# Patient Record
Sex: MALE | Race: WHITE | NOT HISPANIC OR LATINO | Employment: FULL TIME | URBAN - NONMETROPOLITAN AREA
[De-identification: names, ages, dates, MRNs, and addresses within clinical notes are randomized per-mention and may not be internally consistent; named-entity substitution may affect disease eponyms.]

---

## 2023-01-01 ENCOUNTER — DOCUMENTATION ONLY (OUTPATIENT)
Dept: RADIATION ONCOLOGY | Facility: HOSPITAL | Age: 58
End: 2023-01-01

## 2023-01-01 ENCOUNTER — RESULTS ONLY (OUTPATIENT)
Dept: RADIATION ONCOLOGY | Facility: HOSPITAL | Age: 58
End: 2023-01-01

## 2023-01-01 ENCOUNTER — MEDICAL CORRESPONDENCE (OUTPATIENT)
Dept: HEALTH INFORMATION MANAGEMENT | Facility: OTHER | Age: 58
End: 2023-01-01

## 2023-01-01 ENCOUNTER — APPOINTMENT (OUTPATIENT)
Dept: RADIATION ONCOLOGY | Facility: HOSPITAL | Age: 58
End: 2023-01-01
Payer: COMMERCIAL

## 2023-01-01 ENCOUNTER — DOCUMENTATION ONLY (OUTPATIENT)
Dept: CARE COORDINATION | Facility: CLINIC | Age: 58
End: 2023-01-01

## 2023-01-01 ENCOUNTER — ALLIED HEALTH/NURSE VISIT (OUTPATIENT)
Dept: RADIATION ONCOLOGY | Facility: HOSPITAL | Age: 58
End: 2023-01-01
Payer: COMMERCIAL

## 2023-01-01 ENCOUNTER — HOSPITAL ENCOUNTER (EMERGENCY)
Facility: HOSPITAL | Age: 58
Discharge: HOME | End: 2023-10-11
Attending: NURSE PRACTITIONER | Admitting: NURSE PRACTITIONER

## 2023-01-01 ENCOUNTER — OFFICE VISIT (OUTPATIENT)
Dept: RADIATION ONCOLOGY | Facility: HOSPITAL | Age: 58
End: 2023-01-01

## 2023-01-01 ENCOUNTER — CARE COORDINATION (OUTPATIENT)
Dept: ONCOLOGY | Facility: OTHER | Age: 58
End: 2023-01-01

## 2023-01-01 ENCOUNTER — PATIENT OUTREACH (OUTPATIENT)
Dept: CARE COORDINATION | Facility: CLINIC | Age: 58
End: 2023-01-01

## 2023-01-01 ENCOUNTER — ALLIED HEALTH/NURSE VISIT (OUTPATIENT)
Dept: RADIATION ONCOLOGY | Facility: HOSPITAL | Age: 58
End: 2023-01-01

## 2023-01-01 ENCOUNTER — ONCOLOGY VISIT (OUTPATIENT)
Dept: RADIATION ONCOLOGY | Facility: HOSPITAL | Age: 58
End: 2023-01-01
Attending: STUDENT IN AN ORGANIZED HEALTH CARE EDUCATION/TRAINING PROGRAM
Payer: COMMERCIAL

## 2023-01-01 ENCOUNTER — OFFICE VISIT (OUTPATIENT)
Dept: RADIATION ONCOLOGY | Facility: HOSPITAL | Age: 58
End: 2023-01-01
Payer: COMMERCIAL

## 2023-01-01 ENCOUNTER — TELEPHONE (OUTPATIENT)
Dept: RADIATION ONCOLOGY | Facility: HOSPITAL | Age: 58
End: 2023-01-01

## 2023-01-01 ENCOUNTER — HEALTH MAINTENANCE LETTER (OUTPATIENT)
Age: 58
End: 2023-01-01

## 2023-01-01 ENCOUNTER — OFFICE VISIT (OUTPATIENT)
Dept: OTOLARYNGOLOGY | Facility: OTHER | Age: 58
End: 2023-01-01
Attending: OTOLARYNGOLOGY

## 2023-01-01 ENCOUNTER — HOSPITAL ENCOUNTER (OUTPATIENT)
Dept: EDUCATION SERVICES | Facility: HOSPITAL | Age: 58
Discharge: HOME | End: 2023-11-02

## 2023-01-01 ENCOUNTER — OFFICE VISIT (OUTPATIENT)
Dept: OTOLARYNGOLOGY | Facility: OTHER | Age: 58
End: 2023-01-01
Attending: OTOLARYNGOLOGY
Payer: COMMERCIAL

## 2023-01-01 ENCOUNTER — APPOINTMENT (OUTPATIENT)
Dept: GENERAL RADIOLOGY | Facility: HOSPITAL | Age: 58
End: 2023-01-01
Attending: NURSE PRACTITIONER

## 2023-01-01 VITALS
DIASTOLIC BLOOD PRESSURE: 68 MMHG | OXYGEN SATURATION: 93 % | HEART RATE: 80 BPM | BODY MASS INDEX: 17.31 KG/M2 | WEIGHT: 104 LBS | SYSTOLIC BLOOD PRESSURE: 112 MMHG | RESPIRATION RATE: 24 BRPM | TEMPERATURE: 97.1 F

## 2023-01-01 VITALS
DIASTOLIC BLOOD PRESSURE: 52 MMHG | WEIGHT: 109 LBS | OXYGEN SATURATION: 98 % | HEART RATE: 73 BPM | SYSTOLIC BLOOD PRESSURE: 106 MMHG | HEIGHT: 65 IN | BODY MASS INDEX: 18.16 KG/M2 | TEMPERATURE: 96.5 F

## 2023-01-01 VITALS
HEART RATE: 65 BPM | RESPIRATION RATE: 23 BRPM | WEIGHT: 106.3 LBS | DIASTOLIC BLOOD PRESSURE: 62 MMHG | BODY MASS INDEX: 17.71 KG/M2 | OXYGEN SATURATION: 97 % | TEMPERATURE: 97.5 F | HEIGHT: 65 IN | SYSTOLIC BLOOD PRESSURE: 108 MMHG

## 2023-01-01 VITALS
WEIGHT: 108 LBS | HEART RATE: 78 BPM | BODY MASS INDEX: 17.97 KG/M2 | DIASTOLIC BLOOD PRESSURE: 62 MMHG | SYSTOLIC BLOOD PRESSURE: 108 MMHG | RESPIRATION RATE: 16 BRPM | OXYGEN SATURATION: 96 % | TEMPERATURE: 97.1 F

## 2023-01-01 VITALS
OXYGEN SATURATION: 91 % | WEIGHT: 105 LBS | SYSTOLIC BLOOD PRESSURE: 102 MMHG | RESPIRATION RATE: 16 BRPM | BODY MASS INDEX: 17.47 KG/M2 | DIASTOLIC BLOOD PRESSURE: 64 MMHG | TEMPERATURE: 97.3 F | HEART RATE: 68 BPM

## 2023-01-01 VITALS
DIASTOLIC BLOOD PRESSURE: 44 MMHG | HEIGHT: 65 IN | BODY MASS INDEX: 18.14 KG/M2 | RESPIRATION RATE: 16 BRPM | SYSTOLIC BLOOD PRESSURE: 119 MMHG | TEMPERATURE: 98.6 F | OXYGEN SATURATION: 96 % | HEART RATE: 68 BPM

## 2023-01-01 VITALS
HEART RATE: 76 BPM | SYSTOLIC BLOOD PRESSURE: 104 MMHG | BODY MASS INDEX: 18.14 KG/M2 | TEMPERATURE: 97.1 F | WEIGHT: 109 LBS | OXYGEN SATURATION: 98 % | DIASTOLIC BLOOD PRESSURE: 54 MMHG

## 2023-01-01 VITALS
HEART RATE: 82 BPM | RESPIRATION RATE: 16 BRPM | SYSTOLIC BLOOD PRESSURE: 100 MMHG | WEIGHT: 106.2 LBS | TEMPERATURE: 97.4 F | OXYGEN SATURATION: 95 % | DIASTOLIC BLOOD PRESSURE: 64 MMHG | BODY MASS INDEX: 17.67 KG/M2

## 2023-01-01 VITALS
RESPIRATION RATE: 17 BRPM | WEIGHT: 107.2 LBS | HEART RATE: 70 BPM | OXYGEN SATURATION: 97 % | TEMPERATURE: 97.6 F | BODY MASS INDEX: 17.84 KG/M2 | DIASTOLIC BLOOD PRESSURE: 62 MMHG | SYSTOLIC BLOOD PRESSURE: 110 MMHG

## 2023-01-01 VITALS
SYSTOLIC BLOOD PRESSURE: 88 MMHG | OXYGEN SATURATION: 96 % | BODY MASS INDEX: 18.09 KG/M2 | RESPIRATION RATE: 16 BRPM | DIASTOLIC BLOOD PRESSURE: 48 MMHG | HEART RATE: 72 BPM | WEIGHT: 108.7 LBS | TEMPERATURE: 97.1 F

## 2023-01-01 VITALS — OXYGEN SATURATION: 95 % | HEART RATE: 95 BPM

## 2023-01-01 DIAGNOSIS — C76.0 HEAD AND NECK CANCER (H): Primary | ICD-10-CM

## 2023-01-01 DIAGNOSIS — R22.1 LOCALIZED SWELLING, MASS AND LUMP, NECK: ICD-10-CM

## 2023-01-01 DIAGNOSIS — T66.XXXA RADIATION ESOPHAGITIS: Primary | ICD-10-CM

## 2023-01-01 DIAGNOSIS — C32.1 MALIGNANT NEOPLASM OF SUPRAGLOTTIS (H): Primary | ICD-10-CM

## 2023-01-01 DIAGNOSIS — K20.80 RADIATION ESOPHAGITIS: ICD-10-CM

## 2023-01-01 DIAGNOSIS — C32.1 PRIMARY SQUAMOUS CELL CARCINOMA OF SUPRAGLOTTIS (H): Primary | ICD-10-CM

## 2023-01-01 DIAGNOSIS — R11.0 NAUSEA: ICD-10-CM

## 2023-01-01 DIAGNOSIS — R63.4 WEIGHT LOSS: ICD-10-CM

## 2023-01-01 DIAGNOSIS — Z87.891 HISTORY OF TOBACCO ABUSE: ICD-10-CM

## 2023-01-01 DIAGNOSIS — T85.598A FEEDING TUBE DYSFUNCTION, INITIAL ENCOUNTER: ICD-10-CM

## 2023-01-01 DIAGNOSIS — J98.8 CONGESTION OF UPPER AIRWAY: Primary | ICD-10-CM

## 2023-01-01 DIAGNOSIS — R68.2 DRY MOUTH: ICD-10-CM

## 2023-01-01 DIAGNOSIS — F10.11 HISTORY OF ALCOHOL ABUSE: ICD-10-CM

## 2023-01-01 DIAGNOSIS — C76.0 HEAD AND NECK CANCER (H): ICD-10-CM

## 2023-01-01 DIAGNOSIS — K20.80 RADIATION ESOPHAGITIS: Primary | ICD-10-CM

## 2023-01-01 DIAGNOSIS — Z43.0 TRACHEOSTOMY CARE (H): ICD-10-CM

## 2023-01-01 DIAGNOSIS — J39.2 OROPHARYNGEAL BLEEDING: ICD-10-CM

## 2023-01-01 DIAGNOSIS — T66.XXXA RADIATION ESOPHAGITIS: ICD-10-CM

## 2023-01-01 LAB
RAD ONC ARIA COURSE ID: NORMAL
RAD ONC ARIA COURSE LAST TREATMENT DATE: NORMAL
RAD ONC ARIA COURSE START DATE: NORMAL
RAD ONC ARIA COURSE TREATMENT ELAPSED DAYS: 0
RAD ONC ARIA COURSE TREATMENT ELAPSED DAYS: 1
RAD ONC ARIA COURSE TREATMENT ELAPSED DAYS: 12
RAD ONC ARIA COURSE TREATMENT ELAPSED DAYS: 13
RAD ONC ARIA COURSE TREATMENT ELAPSED DAYS: 14
RAD ONC ARIA COURSE TREATMENT ELAPSED DAYS: 15
RAD ONC ARIA COURSE TREATMENT ELAPSED DAYS: 16
RAD ONC ARIA COURSE TREATMENT ELAPSED DAYS: 19
RAD ONC ARIA COURSE TREATMENT ELAPSED DAYS: 2
RAD ONC ARIA COURSE TREATMENT ELAPSED DAYS: 20
RAD ONC ARIA COURSE TREATMENT ELAPSED DAYS: 21
RAD ONC ARIA COURSE TREATMENT ELAPSED DAYS: 22
RAD ONC ARIA COURSE TREATMENT ELAPSED DAYS: 26
RAD ONC ARIA COURSE TREATMENT ELAPSED DAYS: 28
RAD ONC ARIA COURSE TREATMENT ELAPSED DAYS: 29
RAD ONC ARIA COURSE TREATMENT ELAPSED DAYS: 30
RAD ONC ARIA COURSE TREATMENT ELAPSED DAYS: 33
RAD ONC ARIA COURSE TREATMENT ELAPSED DAYS: 34
RAD ONC ARIA COURSE TREATMENT ELAPSED DAYS: 35
RAD ONC ARIA COURSE TREATMENT ELAPSED DAYS: 36
RAD ONC ARIA COURSE TREATMENT ELAPSED DAYS: 40
RAD ONC ARIA COURSE TREATMENT ELAPSED DAYS: 42
RAD ONC ARIA COURSE TREATMENT ELAPSED DAYS: 43
RAD ONC ARIA COURSE TREATMENT ELAPSED DAYS: 44
RAD ONC ARIA COURSE TREATMENT ELAPSED DAYS: 47
RAD ONC ARIA COURSE TREATMENT ELAPSED DAYS: 48
RAD ONC ARIA COURSE TREATMENT ELAPSED DAYS: 5
RAD ONC ARIA COURSE TREATMENT ELAPSED DAYS: 7
RAD ONC ARIA COURSE TREATMENT ELAPSED DAYS: 8
RAD ONC ARIA COURSE TREATMENT ELAPSED DAYS: 9
RAD ONC ARIA FIRST TREATMENT DATE: NORMAL
RAD ONC ARIA PLAN FRACTIONS TREATED TO DATE: 1
RAD ONC ARIA PLAN FRACTIONS TREATED TO DATE: 10
RAD ONC ARIA PLAN FRACTIONS TREATED TO DATE: 11
RAD ONC ARIA PLAN FRACTIONS TREATED TO DATE: 12
RAD ONC ARIA PLAN FRACTIONS TREATED TO DATE: 13
RAD ONC ARIA PLAN FRACTIONS TREATED TO DATE: 14
RAD ONC ARIA PLAN FRACTIONS TREATED TO DATE: 15
RAD ONC ARIA PLAN FRACTIONS TREATED TO DATE: 16
RAD ONC ARIA PLAN FRACTIONS TREATED TO DATE: 17
RAD ONC ARIA PLAN FRACTIONS TREATED TO DATE: 18
RAD ONC ARIA PLAN FRACTIONS TREATED TO DATE: 19
RAD ONC ARIA PLAN FRACTIONS TREATED TO DATE: 2
RAD ONC ARIA PLAN FRACTIONS TREATED TO DATE: 20
RAD ONC ARIA PLAN FRACTIONS TREATED TO DATE: 21
RAD ONC ARIA PLAN FRACTIONS TREATED TO DATE: 22
RAD ONC ARIA PLAN FRACTIONS TREATED TO DATE: 23
RAD ONC ARIA PLAN FRACTIONS TREATED TO DATE: 24
RAD ONC ARIA PLAN FRACTIONS TREATED TO DATE: 25
RAD ONC ARIA PLAN FRACTIONS TREATED TO DATE: 26
RAD ONC ARIA PLAN FRACTIONS TREATED TO DATE: 27
RAD ONC ARIA PLAN FRACTIONS TREATED TO DATE: 28
RAD ONC ARIA PLAN FRACTIONS TREATED TO DATE: 29
RAD ONC ARIA PLAN FRACTIONS TREATED TO DATE: 3
RAD ONC ARIA PLAN FRACTIONS TREATED TO DATE: 30
RAD ONC ARIA PLAN FRACTIONS TREATED TO DATE: 4
RAD ONC ARIA PLAN FRACTIONS TREATED TO DATE: 5
RAD ONC ARIA PLAN FRACTIONS TREATED TO DATE: 6
RAD ONC ARIA PLAN FRACTIONS TREATED TO DATE: 7
RAD ONC ARIA PLAN FRACTIONS TREATED TO DATE: 8
RAD ONC ARIA PLAN FRACTIONS TREATED TO DATE: 9
RAD ONC ARIA PLAN ID: NORMAL
RAD ONC ARIA PLAN PRESCRIBED DOSE PER FRACTION: 2.2 GY
RAD ONC ARIA PLAN TOTAL FRACTIONS PRESCRIBED: 30
RAD ONC ARIA PLAN TOTAL PRESCRIBED DOSE: 6600 CGY
RAD ONC ARIA REFERENCE POINT DOSAGE GIVEN TO DATE: NORMAL GY
RAD ONC ARIA REFERENCE POINT ID: NORMAL

## 2023-01-01 PROCEDURE — 77386 HC IMRT TREATMENT DELIVERY, COMPLEX: CPT | Performed by: STUDENT IN AN ORGANIZED HEALTH CARE EDUCATION/TRAINING PROGRAM

## 2023-01-01 PROCEDURE — G0463 HOSPITAL OUTPT CLINIC VISIT: HCPCS

## 2023-01-01 PROCEDURE — 99212 OFFICE O/P EST SF 10 MIN: CPT | Performed by: NURSE PRACTITIONER

## 2023-01-01 PROCEDURE — 77014 PR CT GUIDE FOR PLACEMENT RADIATION THERAPY FIELDS: CPT | Mod: 26 | Performed by: STUDENT IN AN ORGANIZED HEALTH CARE EDUCATION/TRAINING PROGRAM

## 2023-01-01 PROCEDURE — 77338 DESIGN MLC DEVICE FOR IMRT: CPT | Mod: 26 | Performed by: RADIOLOGY

## 2023-01-01 PROCEDURE — 77336 RADIATION PHYSICS CONSULT: CPT | Performed by: STUDENT IN AN ORGANIZED HEALTH CARE EDUCATION/TRAINING PROGRAM

## 2023-01-01 PROCEDURE — 77014 HC CT GUIDE FOR PLACEMENT RADIATION THERAPY FIELDS: CPT | Performed by: STUDENT IN AN ORGANIZED HEALTH CARE EDUCATION/TRAINING PROGRAM

## 2023-01-01 PROCEDURE — 77301 RADIOTHERAPY DOSE PLAN IMRT: CPT | Mod: 26 | Performed by: RADIOLOGY

## 2023-01-01 PROCEDURE — 99205 OFFICE O/P NEW HI 60 MIN: CPT | Performed by: STUDENT IN AN ORGANIZED HEALTH CARE EDUCATION/TRAINING PROGRAM

## 2023-01-01 PROCEDURE — 77338 DESIGN MLC DEVICE FOR IMRT: CPT | Performed by: STUDENT IN AN ORGANIZED HEALTH CARE EDUCATION/TRAINING PROGRAM

## 2023-01-01 PROCEDURE — 77300 RADIATION THERAPY DOSE PLAN: CPT | Mod: 26 | Performed by: RADIOLOGY

## 2023-01-01 PROCEDURE — 999N000107 HC STATISTIC NUTRITIONAL THERAPY NO CHARGE: Performed by: DIETITIAN, REGISTERED

## 2023-01-01 PROCEDURE — 77427 RADIATION TX MANAGEMENT X5: CPT | Performed by: STUDENT IN AN ORGANIZED HEALTH CARE EDUCATION/TRAINING PROGRAM

## 2023-01-01 PROCEDURE — 77300 RADIATION THERAPY DOSE PLAN: CPT | Performed by: STUDENT IN AN ORGANIZED HEALTH CARE EDUCATION/TRAINING PROGRAM

## 2023-01-01 PROCEDURE — 31575 DIAGNOSTIC LARYNGOSCOPY: CPT | Performed by: OTOLARYNGOLOGY

## 2023-01-01 PROCEDURE — 99204 OFFICE O/P NEW MOD 45 MIN: CPT | Mod: 25 | Performed by: OTOLARYNGOLOGY

## 2023-01-01 PROCEDURE — 74018 RADEX ABDOMEN 1 VIEW: CPT

## 2023-01-01 PROCEDURE — 77301 RADIOTHERAPY DOSE PLAN IMRT: CPT | Performed by: STUDENT IN AN ORGANIZED HEALTH CARE EDUCATION/TRAINING PROGRAM

## 2023-01-01 RX ORDER — LORAZEPAM 0.5 MG/1
0.5 TABLET ORAL EVERY 4 HOURS
COMMUNITY
Start: 2023-01-01

## 2023-01-01 RX ORDER — ALBUTEROL SULFATE 0.63 MG/3ML
0.63 SOLUTION RESPIRATORY (INHALATION) EVERY 4 HOURS PRN
COMMUNITY
Start: 2023-01-01

## 2023-01-01 RX ORDER — LACTOSE-REDUCED FOOD
10 LIQUID (ML) ORAL 4 TIMES DAILY
Qty: 8280.59 ML | Refills: 3 | Status: SHIPPED | OUTPATIENT
Start: 2023-01-01 | End: 2023-01-01

## 2023-01-01 RX ORDER — HYDROXYZINE HYDROCHLORIDE 10 MG/1
10 TABLET, FILM COATED ORAL EVERY 8 HOURS PRN
COMMUNITY
Start: 2023-01-01

## 2023-01-01 RX ORDER — POT SOR/HE-CELLULOS/POV/HYALUR
1 GEL IN PACKET (ML) MUCOUS MEMBRANE 4 TIMES DAILY
Qty: 445 ML | Refills: 1 | Status: SHIPPED | OUTPATIENT
Start: 2023-01-01 | End: 2023-01-01

## 2023-01-01 RX ORDER — GUAIFENESIN 200 MG/10ML
300 LIQUID ORAL EVERY 6 HOURS PRN
Qty: 473 ML | Refills: 1 | Status: SHIPPED | OUTPATIENT
Start: 2023-01-01

## 2023-01-01 RX ORDER — NICOTINE 21 MG/24HR
1 PATCH, TRANSDERMAL 24 HOURS TRANSDERMAL EVERY 24 HOURS
COMMUNITY

## 2023-01-01 RX ORDER — PROCHLORPERAZINE MALEATE 10 MG
10 TABLET ORAL EVERY 8 HOURS PRN
Qty: 90 TABLET | Refills: 0 | Status: SHIPPED | OUTPATIENT
Start: 2023-01-01 | End: 2023-01-01

## 2023-01-01 RX ORDER — HYDROCODONE BITARTRATE AND ACETAMINOPHEN 7.5; 325 MG/15ML; MG/15ML
15 SOLUTION ORAL EVERY 6 HOURS
COMMUNITY

## 2023-01-01 RX ORDER — ACETAMINOPHEN 500 MG
500-1000 TABLET ORAL EVERY 6 HOURS PRN
COMMUNITY

## 2023-01-01 RX ORDER — LACTOSE-REDUCED FOOD
10 LIQUID (ML) ORAL 4 TIMES DAILY
Qty: 8280.59 ML | Refills: 3 | Status: SHIPPED | OUTPATIENT
Start: 2023-01-01

## 2023-01-01 RX ORDER — DIPHENHYDRAMINE HCL 50 MG
50 CAPSULE ORAL
COMMUNITY

## 2023-01-01 RX ORDER — CIPROFLOXACIN 500 MG/1
500 TABLET, FILM COATED ORAL 2 TIMES DAILY
COMMUNITY
Start: 2023-01-01 | End: 2023-01-01

## 2023-01-01 ASSESSMENT — ENCOUNTER SYMPTOMS
INSOMNIA: 0
DIARRHEA: 0
COUGH: 0
FEVER: 0
CONSTIPATION: 0
VOMITING: 0
SHORTNESS OF BREATH: 0
WEIGHT LOSS: 1
VOMITING: 0
CHILLS: 0
ACTIVITY CHANGE: 1
HEADACHES: 0
DIARRHEA: 0
NAUSEA: 0
FEVER: 0
NAUSEA: 0
ABDOMINAL PAIN: 0
SHORTNESS OF BREATH: 0
CHILLS: 0

## 2023-01-01 ASSESSMENT — PAIN SCALES - GENERAL
PAINLEVEL: SEVERE PAIN (6)
PAINLEVEL: MODERATE PAIN (4)
PAINLEVEL: MODERATE PAIN (4)
PAINLEVEL: SEVERE PAIN (6)
PAINLEVEL: MILD PAIN (2)
PAINLEVEL: NO PAIN (0)
PAINLEVEL: WORST PAIN (10)
PAINLEVEL: MODERATE PAIN (4)
PAINLEVEL: SEVERE PAIN (7)

## 2023-01-01 ASSESSMENT — ACTIVITIES OF DAILY LIVING (ADL): ADLS_ACUITY_SCORE: 35

## 2023-01-01 ASSESSMENT — PATIENT HEALTH QUESTIONNAIRE - PHQ9: SUM OF ALL RESPONSES TO PHQ QUESTIONS 1-9: 3

## 2023-08-08 NOTE — TELEPHONE ENCOUNTER
Clinic Care Coordination Contact  Care Team Conversations    Referral due to needing financial support due to ongoing complex medical conditions. Provided him number for [facility] and local VW officer in [place].    PRITI Farah on 8/8/2023 at 11:55 AM

## 2023-08-11 NOTE — PROGRESS NOTES
document embedded image  Patient Name: Gómez Keenan    Address: [address] [facility] [address]    YOB: 1965    [place] [address]    MR Number: [id number]    Phone: [phone]  PCP: UNKNOWN            Appointment Date: 08/08/23   Visit Provider: Manish Mitchell MD    cc: UNKNOWN; ~    ENT Progress Note  Intake  Visit Reasons: Mass on neck    HPI  History of Present Illness  Chief complaint:  Mass on neck    History  The patient is a 58-year-old man with a 60 pack year smoking history who was [facility] us [facility] the [facility] for [facility] with a neck mass.  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  His chest CT revealed significant emphysema with multiple pulmonary nodules.  He is had significant swallowing difficulties in his lost a great deal of weight.  He admits he is having some breathing issues in his noted substantial change in his voice.  I have access to his reports on his CTs but unfortunately has films were not forwarded for my review today.     Exam  Neck-the patient is very thin and he has multiple palpable hard cervical lymph nodes bilaterally  Nasal-no obstruction or purulence noted  Oral cavity oropharynx-free of lesion  Indirect laryngoscopy difficult secondary to gag  Flexible fiberoptic laryngoscopy-a flexible scope was passed through his left nostril.  His nasopharynx is free of disease  Inspection of his hypopharynx and larynx reveals necrotic tumor that appears to have destroyed the majority of his epiglottis in his invading into his tongue base and supraglottic larynx.  The left side of his larynx is fixed.  I can only visualize the right vocal cord which moves.  His airway is narrowed but safe for the moment.  General-the patient appears weak and cachectic but in no distress  Neuro-there are no cranial nerve deficits aside from his disturbed larynx.    A&P  Assessment  & Plan  (1) Cancer of supraglottis:        Status: Acute        Code(s):  C32.1 - Malignant neoplasm of supraglottis           (2) Neck mass:        Status: Acute        Code(s):  R22.1 - Localized swelling, mass and lump, neck             Plan  We need a PET-CT to assess his multiple pulmonary nodules in the face of possible metastatic disease.  I need to carefully review his laryngeal CT to assess candidacy for total laryngectomy.  He will likely need a tracheotomy to get him scoped and biopsied.  I will contact him later in the week after I have had a chance to review his films.        Orders:  Orders  PET CT skull to thigh Today C32.1 - Malignant neoplasm of supraglottis, R22.1 - Localized swelling, mass and lump, neck                   Manish Mitchell MD    Filed: 08/08/23 1146    <Electronically signed by Manish Mitchell MD> 08/08/23 1256

## 2023-08-29 NOTE — NURSING NOTE
Phone call to [facility] [phone].  They do not have a prior authorization on file.  Call to Dr. Danielle's office and spoke to Geovanna.  Dr. Danielle's office will submit a Request For Service to [facility].  Once prior authorization is received, patient will be scheduled for consultation.      Chelsea Trejo RN

## 2023-09-13 PROBLEM — C76.0 HEAD AND NECK CANCER (H): Status: ACTIVE | Noted: 2023-01-01

## 2023-09-13 PROBLEM — M54.50 CHRONIC LOW BACK PAIN: Status: ACTIVE | Noted: 2023-01-01

## 2023-09-13 PROBLEM — I10 HYPERTENSION: Status: ACTIVE | Noted: 2023-01-01

## 2023-09-13 PROBLEM — J44.1 CHRONIC OBSTRUCTIVE PULMONARY DISEASE WITH (ACUTE) EXACERBATION (H): Chronic | Status: ACTIVE | Noted: 2023-01-01

## 2023-09-13 PROBLEM — J38.6 SUPRAGLOTTIC AIRWAY OBSTRUCTION: Status: ACTIVE | Noted: 2023-01-01

## 2023-09-13 PROBLEM — R22.1 MASS OF NECK: Status: ACTIVE | Noted: 2023-01-01

## 2023-09-13 PROBLEM — F43.22 ADJUSTMENT DISORDER WITH ANXIETY: Status: ACTIVE | Noted: 2023-01-01

## 2023-09-13 PROBLEM — H93.19 TINNITUS: Status: ACTIVE | Noted: 2023-01-01

## 2023-09-13 PROBLEM — G89.29 CHRONIC LOW BACK PAIN: Status: ACTIVE | Noted: 2023-01-01

## 2023-09-13 PROBLEM — E43 SEVERE PROTEIN-CALORIE MALNUTRITION (H): Status: ACTIVE | Noted: 2023-01-01

## 2023-09-13 PROBLEM — R91.1 LUNG NODULE: Status: ACTIVE | Noted: 2023-01-01

## 2023-09-13 PROBLEM — M19.90 OSTEOARTHROSIS: Status: ACTIVE | Noted: 2023-01-01

## 2023-09-13 NOTE — NURSING NOTE
VA prior authorization received for radiation therapy 9/13/23.  [facility] authorization  [id number]  Expiration date: 3/5/2024.     Chelsea Trejo RN

## 2023-09-13 NOTE — PROGRESS NOTES
Name: Gómez Keeann MRN: [id number]   : 1965 (58 year old)  Date of Service: 2023   Referring: Liborio Danielle       Diagnosis: head and neck    Prior radiation therapy: None    Prior chemotherapy: None    T/c to obtain health history prior to radiation therapy consult. Spoke with patient.  He feels he is improving everyday since discharge from hospital last week, but it has been emotionally hard because his stepdaughter and granddaughter are currently hospitalized following a car accident.  He is independent with her trach cares.  He has a NG tube and is independent with his tube feedings.  He can drink small amounts of liquids and was okayed to start eating soft foods.  He has met with speech therapy and has been instructed on swallowing exercises.  Discussed cancer rehab and he declined.  He does not have any transportation issues and plans to drive himself because his wife works.  He does have financial concerns and will be given financial resources tomorrow.  He will also meet with our SW if needed.  Given general overview of radiation  therapy and side effects to the neck area.  He has no further questions or concerns.  Appointment confirmed for tomorrow.       Chelsea Trejo RN

## 2023-09-13 NOTE — PROGRESS NOTES
"[facility]  Nutrition Assessment    Gómez Keenan    1965   Sep 14, 2023    Diagnosis: head and neck cancer    Height: 5'5\" Weight: 109# (stated wt)    Usual Weight: 136# Weight Change: -30# over the past year      Past Medical History:   Diagnosis Date    Adjustment disorder with anxiety 09/07/2023    Alcoholic hepatitis 2015    Anemia     CHF (congestive heart failure) (H) 11/23/2012    Chronic low back pain 09/07/2023    Chronic obstructive pulmonary disease with (acute) exacerbation (H) 08/22/2023    Esophageal varices (H)     Per 4/10/19, hx of, repaired w/laser endoscopy in 2004, related to alcohol use    GI bleed 2004    GI bleed, in remission, he was admitted to [facility] [place], [place], from 1/22/2004 to 1/24/2004    Head and neck cancer (H) 08/25/2023    Hypertension 08/22/2023    Hyponatremia 01/08/2015    Insomnia, unspecified 01/30/2004    Formatting of this note might be different from the original. He will go to sleep for an hour and then wakes up, he can use Lorazepam at bedtime. Updated per 10/1/17 IMO import    Lung nodule 08/29/2023    Open fracture of finger, distal phalanx 2013    Osteoarthritis 2019    Osteoarthrosis 09/07/2023    Peptic ulcer disease 2012    Pericardial effusion 2012    Pericarditis 11/30/2012    Serrated adenoma of colon 09/11/2014    Severe protein-calorie malnutrition (H) 08/23/2023    Formatting of this note might be different from the original. Pt meets ASPEN criteria for severe malnutrition in the context of acute / likely chronic illness. See Dietitian entry 8/23 for supporting information and plan of care.    Supraglottic airway obstruction 08/22/2023    Tinnitus 09/07/2023    Tobacco use disorder 01/08/2007       1. Receiving Chemotherapy? Yes  - 1 point  2. Weight Loss per Month (in pounds) Based on Usual Weight: 0    100# 100-120# 120-150# 150-200# greater than 200# Pt. System   greater than 5 5 - 6 6 - 8 7 - 10 greater than 10 1 Point   greater " than 7 7- 9 9 - 11 11 - 14 greater than 15 2 Points   greater than 10 10 - 12 12 - 15 15 - 20 greater than 20 3 Points       3. Eating Problems: ( 1 Point for Each Problem)       Loss of Appetite     No - 0 points    Difficulty Swallowing    Yes  - 1 point   Nausea    No - 0 points    Early Satiety    No - 0 points   Vomiting    No - 0 points    Taste/Smell Aversions  No - 0 points    Diarrhea    No - 0 points    Esophageal Reflux   No - 0 points   Mouth Soreness/Difficulty Chewing  No- 0 points          Total: 1    4.  Automatic Referral for the Following Diagnosis or Situations: Tube Feedings - Jevity 1200 mL/day via NG. Patient declined another dietary referral.      Comments: He has already met with a dietician and is tolerating his tube feedings and has no concerns.      Total Score: 2 (Scores greater than or equal to 4 will receive a Dietician Consult)        Chelsea Trejo RN

## 2023-09-13 NOTE — PROGRESS NOTES
[facility]    RADIATION ONCOLOGY CONSULTATION      Gómez Keenan  is referred by Dr. Danielle an oncology consultation.    PRIMARY PHYSICIAN: No Ref-Primary, Physician     Cancer Staging   No matching staging information was found for the patient.    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 His staging appears to be [id number]. He has an NG tube in place and unfortunately could not get a PEG placed due to the location of his stomach being directly posterior to the colon. We extensively discussed with the patient and his wife today the rationale for radiotherapy site of primary disease and bilateral neck nodes.  The logistics, benefits, risks and side effects (both short and long-term) of a course of definitive radiotherapy along with concurrent systemic therapy were explained to them.  This was all discussed with the caveat that since he now has biopsy-proven disease within the lung, the overall plan of care may potentially be modified after his upcoming medical oncology outpatient consultation with Dr. Sen.     We did discuss the options of systemic therapy only vs moving forward with aggressive local treatment with the hope that perhaps the concurrent systemic therapy would address his pulmonary nodules and then monitoring them with imaging thereafter.  I was clear in deferring a more full and detailed discussion regarding the role of systemic therapy to my medical oncology colleague. I spoke with Dr. Sen briefly and will await the official consultation.     PLAN: We will await his medical oncology consultation then before finalizing his plan of care. In the interest of time, we can move forward with a CT simulation for treatment planning. His treatment plan would be 70 Gy in 35 fractions to gross disease and involved nodes with a simultaneous integrated boost  of 63 Gy to intermediate risk nodes and 56 Gy to low risk nodes using an IMRT/VMAT approach.   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  Patient presented on 8/22/2023 with acute respiratory failure due to airway obstruction, he underwent an urgent tracheostomy.  He is felt to be less likely a surgical candidate.  History as below.    7/24/2023 CT soft tissue neck with contrast: Infiltrative mass in the larynx with extensive necrotic cervical adenopathy.  Emphysema with multiple intermediate pulmonary nodules    8/18/2023 PET/CT skull base to mid thigh: FDG avid supraglottic mass with metastases to the bilateral cervical and right supraclavicular lymph nodes.  Mildly FDG avid pulmonary nodules and hilar lymph nodes that may be metastatic versus infectious/inflammatory.    8/22/2023 tracheostomy, Direct laryngoscopy with biopsy: Larynx, supraglottic mass biopsy-invasive moderately differentiated keratinizing squamous cell carcinoma, tumor infiltrates minor salivary glandular tissue and infiltrates around cartilaginous tissue.  Operative report notes necrotic tumor invading base of tongue and epiglottis    8/25/2023 medical oncology consult: Plan for consideration of concurrent chemotherapy and radiation.    8/28/2023 chest CT with contrast: Interval increase in size and number of multiple small subcentimeter pulmonary nodules.  Left apical nodule measures 6 mm.  Subpleural anterior left upper lobe nodule measures 6 mm.  Smaller nodules present through both lobes.  Interval enlargement of presumed pulmonary metastatic disease.  No new pathologically enlarged mediastinal or hilar lymph  nodes.    9/6/2023 lung, right, broncho alveolar lavage: Atypical squamous cells, suspicious for malignancy.    9/6/2023 nodule, middle lobe right lung, bronchoscopically guided fine-needle aspiration: Moderately differentiated keratinizing squamous cell carcinoma.  Level 11 R lymph node fine-needle aspiration-no malignant cells identified.    Scheduling Conflicts: lives 60 miles away  Systemic Therapy: pend (plan for concurrent)   Port: no  Pacemaker: no  Hx of autoimmune disorders: no  Previous Radiation Therapy: no    Past Medical History:   Diagnosis Date    Adjustment disorder with anxiety 09/07/2023    Alcoholic hepatitis 2015    Anemia     CHF (congestive heart failure) (H) 11/23/2012    Chronic low back pain 09/07/2023    Chronic obstructive pulmonary disease with (acute) exacerbation (H) 08/22/2023    Esophageal varices (H)     Per 4/10/19, hx of, repaired w/laser endoscopy in 2004, related to alcohol use    GI bleed 2004    GI bleed, in remission, he was admitted to [facility], [place], [place], from 1/22/2004 to 1/24/2004    Head and neck cancer (H) 08/25/2023    Hypertension 08/22/2023    Hyponatremia 01/08/2015    Insomnia, unspecified 01/30/2004    Formatting of this note might be different from the original. He will go to sleep for an hour and then wakes up, he can use Lorazepam at bedtime. Updated per 10/1/17 IMO import    Lung nodule 08/29/2023    Open fracture of finger, distal phalanx 2013    Osteoarthritis 2019    Osteoarthrosis 09/07/2023    Peptic ulcer disease 2012    Pericardial effusion 2012    Pericarditis 11/30/2012    Serrated adenoma of colon 09/11/2014    Severe protein-calorie malnutrition (H) 08/23/2023    Formatting of this note might be different from the original. Pt meets ASPEN criteria for severe malnutrition in the context of acute / likely chronic illness. See Dietitian entry 8/23 for supporting information and plan of care.    Supraglottic airway obstruction 08/22/2023    Tinnitus  2023    Tobacco use disorder 2007       Past Surgical History:   Procedure Laterality Date    BRONCHOSCOPY  2023    COLONOSCOPY W/ POLYPECTOMY  2014    CYST REMOVAL Left     Excision of sebaceous cyst from his left ear    ENDOBRONCHIAL ULTRASOUND  2023    ESOPHAGUS SURGERY      Esophageal varices laser repair without any f/u endoscopy    KNEE SURGERY      Repair of a laceration to his left knee at age 9    OTHER SURGICAL HISTORY  2019    Excision soft tissue mass @ posterior neck    TRACHEOSTOMY  2023    TRACHEOSTOMY, Direct laryngoscopy with biopsy    UPPER GI ENDOSCOPY  2004    Esophagogastroduodenoscopy with biopsy, Upper GI bleed with gastritis and [place] tear       Family History   Problem Relation Age of Onset    Throat cancer Mother         secondary to alcohol       Social History     Tobacco Use    Smoking status: Former     Packs/day: 1.50     Years: 35.00     Pack years: 52.50     Types: Cigarettes     Quit date: 2023     Years since quittin.1    Smokeless tobacco: Never   Substance Use Topics    Alcohol use: Not Currently     Comment: sober for 8-9 years         CURRENT MEDICATIONS:   Current Outpatient Medications   Medication    acetaminophen (TYLENOL) 500 MG tablet    ciprofloxacin (CIPRO) 500 MG tablet    diphenhydrAMINE (BENADRYL) 50 MG capsule    hydrOXYzine (ATARAX) 10 MG tablet    nicotine (NICODERM CQ) 21 MG/24HR 24 hr patch    albuterol (ACCUNEB) 0.63 MG/3ML neb solution    HYDROcodone-acetaminophen 7.5-325 MG/15ML solution     No current facility-administered medications for this visit.         ALLERGIES:  Allergies   Allergen Reactions    Aspirin Nausea     Per 4/10/19, allergy to aspirin-related medications.         Review of Systems   Constitutional:  Positive for weight loss. Negative for chills, fever and malaise/fatigue.   HENT:          Able to swallow soft food such as apple sauce, otherwise is nearly dependent on NG tube  "feeds     Respiratory:  Negative for cough and shortness of breath.    Cardiovascular:  Negative for chest pain.   Gastrointestinal:  Negative for constipation, diarrhea, nausea and vomiting.   Psychiatric/Behavioral:  The patient does not have insomnia.            VITAL SIGNS:  /62 (BP Location: Right arm, Patient Position: Chair, Cuff Size: Adult Regular)   Pulse 65   Temp 97.5  F (36.4  C) (Tympanic)   Resp 23   Ht 1.651 m (5' 5\")   Wt 48.2 kg (106 lb 4.8 oz)   SpO2 97%   BMI 17.69 kg/m    Wt Readings from Last 4 Encounters:   09/14/23 48.2 kg (106 lb 4.8 oz)       PHYSICAL EXAM:  Constitutional: awake, alert, cooperative, no apparent distress, and appears stated age, patient is thin   Eyes: Lids and lashes normal, sclera clear, conjunctiva normal  ENT: Normocephalic, top/bottom dentures present, OC/OPX (difficult to visualize well due to aggressive gag reflex) otherwise unremarkable.   Hematologic / Lymphatic: bilateral cervical level 2/3 lymphadenopathy.    Respiratory: no increased work of breathing, tracheostomy present, lungs diminished to auscultation, rhonchi present throughout   Cardiovascular: Normal apical impulse, regular rate and rhythm, normal S1 and S2  GI: Normal bowel sounds, soft, non-distended, non-tender  Skin: intact, tracheostomy site with mucous, no s/s of infection  Musculoskeletal: tone is normal  Neurologic: Awake, alert, oriented to name, place and time. Gait is normal.  Neuropsychiatric: General: normal and calm      LORRAINE Tony CNP, MD  Radiation Oncologist    "

## 2023-09-14 NOTE — PROGRESS NOTES
"Radiation Oncology Rooming Note    September 14, 2023 9:19 AM     Gómez Keenan is a 58 year old male who presents for:       Chief Complaint   Patient presents with    Consult     Radiation therapy consult       Initial Vitals: /62 (BP Location: Right arm, Patient Position: Chair, Cuff Size: Adult Regular)   Pulse 65   Temp 97.5  F (36.4  C) (Tympanic)   Resp 23   Ht 1.651 m (5' 5\")   Wt 48.2 kg (106 lb 4.8 oz)   SpO2 97%   BMI 17.69 kg/m     Estimated body mass index is 17.69 kg/m  as calculated from the following:    Height as of this encounter: 1.651 m (5' 5\").    Weight as of this encounter: 48.2 kg (106 lb 4.8 oz).   Body surface area is 1.49 meters squared.  Severe Pain (6) Comment: right jaw, sometimes into ear, pressure, dull ache. Trying to avoid narcotics and managing with over the counter medications.     Patient completed the following questionnaires: PHQ-9 and NCCN Distress Thermometer.       Patient was assessed using the NCCN psychosocial distress thermometer. Patient rated the score as a zero. Patient rated current stressors as family health problems, eating, fatigue, pain, and sleep. Stressors brought to the attention of Olena Rivera CNP for a score of 6 or greater or per nurses discretion.       Patient received folder containing the following:  advance directives information/application  NCI Radiation Therapy and You booklet  radiation site specific side effects chart  radiation planning process packet  radiation therapy timeline  financial letter  vaccines during cancer treatment hand out  mental health services and providers packet  cancer resource list  cancer rehab information handout  family support group handouts   business card  radiation therapy business card      Financial assistance resources given to patient:  [employer] application       Patient given site specific instructions including:   skin care instructions.      Kristi Ocasio LPN                      "

## 2023-09-19 NOTE — NURSING NOTE
Dr. Sen called to discuss plan of care.  Plan is for local therapy with radiation and weekly radiosensitizing cisplatin and when that treatment is completed to turn attention back to systemic therapy.  This information was relayed to Olena Rivera DNP, and Dr. Conner.    Chelsea Trejo RN

## 2023-09-20 NOTE — PROGRESS NOTES
[facility]  DEPARTMENT OF RADIATION ONCOLOGY   SIMULATION NOTE    Name: Anali Keenan MRN: [id number]   : 1965 (58 year old)  Date of Service: Sep 20, 2023        Diagnosis and Cancer Staging   Head and neck cancer (H)  Staging form: Larynx - Glottis, AJCC 8th Edition  - Clinical: No stage assigned - Unsigned       Procedure   For non-SBRT treatment, the patient comes to clinic for simulation and radiation therapy for treatment as specified on the written consent (site of treatment, type of cancer). Therapy, Treatment Planning, and I reviewed the anticipated radiation therapy, clinical history and documentation, and radiographic information and images. We verified written consent for treatment. With the patient, we verified their identification, site, and side of treatment. We evaluated multiple setup positions and elected to simulate the patient in a modified supine position. We used orthogonal lasers to align them with the CT simulator. We immobilized the patient with a customized facemask and accessories to improve the reproducibility and safety for daily radiation therapy. We placed radiopaque markers to assist in identifying topographical landmarks for simulation. We obtained  and axial CT imaging through the target region. We used virtual simulation techniques to verify the adequacy of the CT images and to create a preliminary setup isocenter. Motion management was not utilized. We placed tattoos to anali the setup isocenter. The patient tolerated the procedure well and without complications. We will use available diagnostic and radiation therapy imaging studies for CT-based treatment planning with image fusion as indicated. I anticipate utilizing a form of intensity-modulated or 3D-conformal radiation therapy to develop a computerized treatment plan whose dosimetric analysis (e.g., dose-volume histogram (DVH)) indicates adequate coverage of target tissues and sparing of nearby  normal structures. We will complete routine QA procedures. The patient wished to proceed as recommended. We answered all questions to his satisfaction.    Implanted Cardiac Devices: None.      LORRAINE Tony CNP   Department of Radiation Oncology  Tel: [phone]  Fax: [phone]

## 2023-10-09 NOTE — LETTER
10/9/2023         RE: Gómez Keenan  [address]  [facility] [address]        Dear Colleague,    Thank you for referring your patient, Gómez Keenan, to the [facility]. Please see a copy of my visit note below.    Otolaryngology Consultation    Patient: Gómez Keenan  : 1965    Patient presents with:  Ent Problem: Trach collar came off overnight, coughed up some blood.   Referral:  Dr. Alicia Foster    HPI:  Gómez Keenan is a 58 year old male with advanced [id number] metastatic supraglottic squamous cell carcinoma is seen urgently today for tracheostomy reinsertion.    He was recently diagnosed with supraglottic squamous cell carcinoma.  He initially presented to Dr. Mitchell who ordered a PET scan.  He then presented to [facility] emergency room on  with progressive shortness of breath.  Emergent awake tracheostomy and direct laryngoscopy with biopsies were performed by Dr. Yu.  A 8-0 cuffed Shiley trach tube placed.     There were no recognizable structures on direct laryngoscopy the necrotic tumor invaded the epiglottis and tongue base    He was ultimately offered chemoradiation therapy.      We were contacted by radiation therapy today that his trach had dislodged.      Gómez states he has started bleeding.  He began coughing up bright red blood during the night.   there is no bleeding from his tracheostomy sitedaily  and no excessive secretions.  He is changing his inner cannula and able to wear his speaking valve throughout the day.  He is n.p.o. and NG dependent.     He has no concerns with his trach other than the Velcro trach collar needs to be changed.     He denies shortness of breath or wheezing    Over 20 # weight loss over the past 6 months, weight now stable    Rad initiated on 10/4/23  4/10 rad treatments    He saw Dr. Danielle in oncology on 10/2/2023 who recommended palliative concurrent chemoradiation  There is obvious concern for lung metastases      Kristin in radiation therapy saw Gómez on 9/14/23    Ct neck 7/26/23 shows an large tongue base mass infiltrating the larynx extending through the vallecula and throughout the piriform sinuses.  Except extensive lymphadenopathy largest on the right measures 3.3 cm.    PET dated 8/18/23 shows an FDG avid supraglottic mass with bilateral cervical metastases and right supraclavicular metastases.  Mildly avid pulmonary nodules    Hx tobacco and history of etoh abuse , quit etoh x 8 years  52.5 pack year history of tobacco use.  He quit smoking last month.    Current Outpatient Rx   Medication Sig Dispense Refill     acetaminophen (TYLENOL) 500 MG tablet Take 500-1,000 mg by mouth every 6 hours as needed for mild pain       albuterol (ACCUNEB) 0.63 MG/3ML neb solution Inhale 0.63 mg into the lungs every 4 hours as needed (Patient not taking: Reported on 9/13/2023)       diphenhydrAMINE (BENADRYL) 50 MG capsule Take 50 mg by mouth nightly as needed for itching or allergies       HYDROcodone-acetaminophen 7.5-325 MG/15ML solution Take 15 mLs by mouth every 6 hours (Patient not taking: Reported on 9/13/2023)       hydrOXYzine (ATARAX) 10 MG tablet 10 mg by Per G Tube route every 8 hours as needed       nicotine (NICODERM CQ) 21 MG/24HR 24 hr patch Place 1 patch onto the skin every 24 hours         Allergies: Aspirin     Past Medical History:   Diagnosis Date     Adjustment disorder with anxiety 09/07/2023     Alcoholic hepatitis (H28) 2015     Anemia      CHF (congestive heart failure) (H) 11/23/2012     Chronic low back pain 09/07/2023     Chronic obstructive pulmonary disease with (acute) exacerbation (H) 08/22/2023     Esophageal varices (H)     Per 4/10/19, hx of, repaired w/laser endoscopy in 2004, related to alcohol use     GI bleed 2004    GI bleed, in remission, he was admitted to [facility], [place], [place], from 1/22/2004 to 1/24/2004     Head and neck cancer (H) 08/25/2023     Hypertension 08/22/2023     Hyponatremia  2015     Insomnia, unspecified 2004    Formatting of this note might be different from the original. He will go to sleep for an hour and then wakes up, he can use Lorazepam at bedtime. Updated per 10/1/17 IMO import     Lung nodule 2023     Open fracture of finger, distal phalanx 2013     Osteoarthritis 2019     Osteoarthrosis 2023     Peptic ulcer disease      Pericardial effusion      Pericarditis 2012     Serrated adenoma of colon 2014     Severe protein-calorie malnutrition (H24) 2023    Formatting of this note might be different from the original. Pt meets ASPEN criteria for severe malnutrition in the context of acute / likely chronic illness. See Dietitian entry  for supporting information and plan of care.     Supraglottic airway obstruction 2023     Tinnitus 2023     Tobacco use disorder 2007       Past Surgical History:   Procedure Laterality Date     BRONCHOSCOPY  2023     COLONOSCOPY W/ POLYPECTOMY  2014     CYST REMOVAL Left     Excision of sebaceous cyst from his left ear     ENDOBRONCHIAL ULTRASOUND  2023     ESOPHAGUS SURGERY      Esophageal varices laser repair without any f/u endoscopy     KNEE SURGERY      Repair of a laceration to his left knee at age 9     OTHER SURGICAL HISTORY  2019    Excision soft tissue mass @ posterior neck     TRACHEOSTOMY  2023    TRACHEOSTOMY, Direct laryngoscopy with biopsy     UPPER GI ENDOSCOPY  2004    Esophagogastroduodenoscopy with biopsy, Upper GI bleed with gastritis and America-Spencer tear       ENT family history reviewed    Social History     Tobacco Use     Smoking status: Former     Packs/day: 1.50     Years: 35.00     Pack years: 52.50     Types: Cigarettes     Quit date: 2023     Years since quittin.1     Smokeless tobacco: Never   Vaping Use     Vaping Use: Never used   Substance Use Topics     Alcohol use: Not Currently     Comment: sober  "for 8-9 years     Drug use: Never       Review of Systems  ROS: 10 point ROS neg other than the symptoms noted above in the HPI     Physical Exam  /52 (BP Location: Right arm, Patient Position: Sitting, Cuff Size: Adult Regular)   Pulse 73   Temp (!) 96.5  F (35.8  C) (Tympanic)   Ht 1.651 m (5' 5\")   Wt 49.4 kg (109 lb)   SpO2 98%   BMI 18.14 kg/m      General - The patient is well nourished and well developed, and appears cachectic.   Alert and oriented to person and place, answers questions and cooperates with examination appropriately.   Head and Face - Normocephalic and atraumatic, with no gross asymmetry noted.  The facial nerve is intact, with strong symmetric movements.  Voice and Breathing - The patient was breathing comfortably without the use of accessory muscles. There was no wheezing, stridor, or stertor.  Speaking with speaking valve in place.  No merry peripheral digital clubbing or cyanosis   Ears -The external auditory canals are occluded with cerumen  Eyes - Extraocular movements intact, and the pupils were reactive to light.  Sclera were not icteric or injected, conjunctiva were pink and moist.  Mouth - Examination of the oral cavity showed pink, healthy oral mucosa. No lesions or ulcerations noted.    Throat - The walls of the oropharynx were smooth, pink, moist, symmetric, and had no lesions or ulcerations.  The tonsillar pillars and soft palate were symmetric.  The uvula was midline on elevation.  Tongue base mass noted on palpation  Neck -large fixed right level 3 and 4 cervical lymphadenopathy.  The skin is intact but erythematous.  The largest mass measures over 4 cm.    No palpable fixed thyroid nodules or concerning goiter.  The trachea is grossly midline.   tracheostomy tube in good position with some secretions at the base of the tube which were cleansed.  No crepitus or bleeding from the tracheostomy site.  I offered to change his trach but he declined.  A new velcro collar " placed  Nose - left nares with NG tube. External contour is symmetric, no gross deflection or scars.  Nasal mucosa is pink and moist with no abnormal mucus.   No polyps, masses, or purulence noted on examination.      Attempts at mirror laryngoscopy were not possible due to gag reflex.  Therefore I proceeded with a fiberoptic examination after informed consent.  First I applied topical nasal lidocaine and neosynephrine.  I then passed the scope through the right nasal cavity.     The nasopharynx was mucosally covered and symmetric.  The eustachian tube openings were unobstructed.   I then passed the scope into the supraglottis.  There is diffuse oozing from the bilateral fungating, polypoid tongue base masses.  No arterial or pulsating bleeding.  There are no recognizable structures I cannot visualize the epiglottis nor the vocal cords.    The patient tolerated the procedure well.      Impression and Plan- Gómez SYLVESTER Keenan is a 58 year old male with:    ICD-10-CM    1. Primary squamous cell carcinoma of supraglottis (H)  C32.1       2. Oropharyngeal bleeding  J39.2       3. Tracheostomy care (H)  Z43.0       4. History of tobacco abuse  Z87.891       5. History of alcohol abuse  F10.11               advanced [id number] metastatic supraglottic squamous cell carcinoma     Trach in place, airway controlled  Continue home trach care    I have discussed findings with Dr. Foster  There is nothing more surgically that I can offer Gómez.  He has diffuse mucosal oozing from his primary tumors.  I told him he will continue to have mucosal bleeding.    Monitor h/h through oncology and palliative care      He declined a trach change today    Palliative consult requested by [facility] and placed    Follow-up as needed          Niki Mcdonald D.O.  Otolaryngology/Head and Neck Surgery  Allergy      Again, thank you for allowing me to participate in the care of your patient.        Sincerely,        Niki Mcdonald MD

## 2023-10-09 NOTE — PROGRESS NOTES
Referral received via [facility] for Palliative care services. Pt lives outside of our area and we are therefore not able to accommodate his needs. Pt lives in [place]. There is an [facility] facility there, but I am not aware of what programs they provide. There is also the [facility] in [place] that does provide homecare services. Please contact another provider for the services needed by this client.

## 2023-10-09 NOTE — PROGRESS NOTES
Progress Notes  Encounter Date: Oct 9, 2023  LORRAINE Tony [facility]     RADIATION ONCOLOGY WEEKLY MANAGEMENT PROGRESS NOTE     Patient Care Team         Relationship Specialty Notifications Start End    No Ref-Primary, Physician PCP - General   8/8/23     Fax: [phone]         Charles Sen MD MD Hematology & Oncology  9/13/23     Phone: [phone] Fax: [phone] [address] [facility] [address]    Alicia Foster MD Assigned Cancer Care Provider   9/23/23     Phone: [phone] Fax: [phone]         [address] [address]                    DIAGNOSIS:  Cancer Staging   Head and neck cancer (H)  Staging form: Larynx - Glottis, AJCC 8th Edition  - Clinical: No stage assigned - Unsigned          RADIATION THERAPY:    Gómez Keenan has received 880 cGy to date to SIB Larynx Neck LNs.   Treatment 4/30  Total planned dose: 6600 cGy      SUBJECTIVE:    Currently he reports waking up, coughing up blood and states his tracheostomy collar was not attached. Feels he had more blood and drainage from tracheostomy. Otherwise feels well and has not had bleeding since this episode.         OBJECTIVE:    WEIGHT: 109 lbs 0 oz. /54 (BP Location: Left arm, Patient Position: Chair, Cuff Size: Adult Regular)   Pulse 76   Temp 97.1  F (36.2  C) (Tympanic)   Wt 49.4 kg (109 lb)   SpO2 98%   BMI 18.14 kg/m    Examination reveals an alert, thin appearing male patient, NG tube in place, tracheotomy present - slightly dislodged from previous examination.        IMPRESSION:  Routine tolerance to radiation therapy.       PLAN:  Continue treatment as planned. ENT referral sent for evaluation of tracheostomy placement.       Medical record and imaging reviewed by covering locum provider.      LORRAINE Tony CNP, MD  Radiation Oncologist    I saw this patient while providing locum tenens coverage.

## 2023-10-09 NOTE — PROGRESS NOTES
Otolaryngology Consultation    Patient: Gómez Keenan  : 1965    Patient presents with:  Ent Problem: Trach collar came off overnight, coughed up some blood.   Referral:  Dr. Alicia Foster    HPI:  Gómez Keenan is a 58 year old male with advanced [id number] metastatic supraglottic squamous cell carcinoma is seen urgently today for tracheostomy reinsertion.    He was recently diagnosed with supraglottic squamous cell carcinoma.  He initially presented to Dr. Mitchell who ordered a PET scan.  He then presented to [facility] emergency room on  with progressive shortness of breath.  Emergent awake tracheostomy and direct laryngoscopy with biopsies were performed by Dr. Yu.  A 8-0 cuffed Shiley trach tube placed.     There were no recognizable structures on direct laryngoscopy the necrotic tumor invaded the epiglottis and tongue base    He was ultimately offered chemoradiation therapy.      We were contacted by radiation therapy today that his trach had dislodged.      Gómez states he has started bleeding.  He began coughing up bright red blood during the night.   there is no bleeding from his tracheostomy sitedaily  and no excessive secretions.  He is changing his inner cannula and able to wear his speaking valve throughout the day.  He is n.p.o. and NG dependent.     He has no concerns with his trach other than the Velcro trach collar needs to be changed.     He denies shortness of breath or wheezing    Over 20 # weight loss over the past 6 months, weight now stable    Rad initiated on 10/4/23  4/10 rad treatments    He saw Dr. Danielle in oncology on 10/2/2023 who recommended palliative concurrent chemoradiation  There is obvious concern for lung metastases    Dr. Foster in radiation therapy saw Gómez on 23    Ct neck 23 shows an large tongue base mass infiltrating the larynx extending through the vallecula and throughout the piriform sinuses.  Except extensive lymphadenopathy largest on the  right measures 3.3 cm.    PET dated 8/18/23 shows an FDG avid supraglottic mass with bilateral cervical metastases and right supraclavicular metastases.  Mildly avid pulmonary nodules    Hx tobacco and history of etoh abuse , quit etoh x 8 years  52.5 pack year history of tobacco use.  He quit smoking last month.    Current Outpatient Rx   Medication Sig Dispense Refill    acetaminophen (TYLENOL) 500 MG tablet Take 500-1,000 mg by mouth every 6 hours as needed for mild pain      albuterol (ACCUNEB) 0.63 MG/3ML neb solution Inhale 0.63 mg into the lungs every 4 hours as needed (Patient not taking: Reported on 9/13/2023)      diphenhydrAMINE (BENADRYL) 50 MG capsule Take 50 mg by mouth nightly as needed for itching or allergies      HYDROcodone-acetaminophen 7.5-325 MG/15ML solution Take 15 mLs by mouth every 6 hours (Patient not taking: Reported on 9/13/2023)      hydrOXYzine (ATARAX) 10 MG tablet 10 mg by Per G Tube route every 8 hours as needed      nicotine (NICODERM CQ) 21 MG/24HR 24 hr patch Place 1 patch onto the skin every 24 hours         Allergies: Aspirin     Past Medical History:   Diagnosis Date    Adjustment disorder with anxiety 09/07/2023    Alcoholic hepatitis (H28) 2015    Anemia     CHF (congestive heart failure) (H) 11/23/2012    Chronic low back pain 09/07/2023    Chronic obstructive pulmonary disease with (acute) exacerbation (H) 08/22/2023    Esophageal varices (H)     Per 4/10/19, hx of, repaired w/laser endoscopy in 2004, related to alcohol use    GI bleed 2004    GI bleed, in remission, he was admitted to [facility], [place], [place], from 1/22/2004 to 1/24/2004    Head and neck cancer (H) 08/25/2023    Hypertension 08/22/2023    Hyponatremia 01/08/2015    Insomnia, unspecified 01/30/2004    Formatting of this note might be different from the original. He will go to sleep for an hour and then wakes up, he can use Lorazepam at bedtime. Updated per 10/1/17 IMO import    Lung nodule 08/29/2023    Open  fracture of finger, distal phalanx 2013    Osteoarthritis 2019    Osteoarthrosis 2023    Peptic ulcer disease 2012    Pericardial effusion     Pericarditis 2012    Serrated adenoma of colon 2014    Severe protein-calorie malnutrition (H24) 2023    Formatting of this note might be different from the original. Pt meets ASPEN criteria for severe malnutrition in the context of acute / likely chronic illness. See Dietitian entry  for supporting information and plan of care.    Supraglottic airway obstruction 2023    Tinnitus 2023    Tobacco use disorder 2007       Past Surgical History:   Procedure Laterality Date    BRONCHOSCOPY  2023    COLONOSCOPY W/ POLYPECTOMY  2014    CYST REMOVAL Left     Excision of sebaceous cyst from his left ear    ENDOBRONCHIAL ULTRASOUND  2023    ESOPHAGUS SURGERY      Esophageal varices laser repair without any f/u endoscopy    KNEE SURGERY      Repair of a laceration to his left knee at age 9    OTHER SURGICAL HISTORY  2019    Excision soft tissue mass @ posterior neck    TRACHEOSTOMY  2023    TRACHEOSTOMY, Direct laryngoscopy with biopsy    UPPER GI ENDOSCOPY  2004    Esophagogastroduodenoscopy with biopsy, Upper GI bleed with gastritis and America-Spencer tear       ENT family history reviewed    Social History     Tobacco Use    Smoking status: Former     Packs/day: 1.50     Years: 35.00     Pack years: 52.50     Types: Cigarettes     Quit date: 2023     Years since quittin.1    Smokeless tobacco: Never   Vaping Use    Vaping Use: Never used   Substance Use Topics    Alcohol use: Not Currently     Comment: sober for 8-9 years    Drug use: Never       Review of Systems  ROS: 10 point ROS neg other than the symptoms noted above in the HPI     Physical Exam  /52 (BP Location: Right arm, Patient Position: Sitting, Cuff Size: Adult Regular)   Pulse 73   Temp (!) 96.5  F (35.8  C) (Tympanic)    "Ht 1.651 m (5' 5\")   Wt 49.4 kg (109 lb)   SpO2 98%   BMI 18.14 kg/m      General - The patient is well nourished and well developed, and appears cachectic.   Alert and oriented to person and place, answers questions and cooperates with examination appropriately.   Head and Face - Normocephalic and atraumatic, with no gross asymmetry noted.  The facial nerve is intact, with strong symmetric movements.  Voice and Breathing - The patient was breathing comfortably without the use of accessory muscles. There was no wheezing, stridor, or stertor.  Speaking with speaking valve in place.  No merry peripheral digital clubbing or cyanosis   Ears -The external auditory canals are occluded with cerumen  Eyes - Extraocular movements intact, and the pupils were reactive to light.  Sclera were not icteric or injected, conjunctiva were pink and moist.  Mouth - Examination of the oral cavity showed pink, healthy oral mucosa. No lesions or ulcerations noted.    Throat - The walls of the oropharynx were smooth, pink, moist, symmetric, and had no lesions or ulcerations.  The tonsillar pillars and soft palate were symmetric.  The uvula was midline on elevation.  Tongue base mass noted on palpation  Neck -large fixed right level 3 and 4 cervical lymphadenopathy.  The skin is intact but erythematous.  The largest mass measures over 4 cm.    No palpable fixed thyroid nodules or concerning goiter.  The trachea is grossly midline.   tracheostomy tube in good position with some secretions at the base of the tube which were cleansed.  No crepitus or bleeding from the tracheostomy site.  I offered to change his trach but he declined.  A new velcro collar placed  Nose - left nares with NG tube. External contour is symmetric, no gross deflection or scars.  Nasal mucosa is pink and moist with no abnormal mucus.   No polyps, masses, or purulence noted on examination.      Attempts at mirror laryngoscopy were not possible due to gag reflex.  " Therefore I proceeded with a fiberoptic examination after informed consent.  First I applied topical nasal lidocaine and neosynephrine.  I then passed the scope through the right nasal cavity.     The nasopharynx was mucosally covered and symmetric.  The eustachian tube openings were unobstructed.   I then passed the scope into the supraglottis.  There is diffuse oozing from the bilateral fungating, polypoid tongue base masses.  No arterial or pulsating bleeding.  There are no recognizable structures I cannot visualize the epiglottis nor the vocal cords.    The patient tolerated the procedure well.      Impression and Plan- Gómez SYLVESTER Keenan is a 58 year old male with:    ICD-10-CM    1. Primary squamous cell carcinoma of supraglottis (H)  C32.1       2. Oropharyngeal bleeding  J39.2       3. Tracheostomy care (H)  Z43.0       4. History of tobacco abuse  Z87.891       5. History of alcohol abuse  F10.11               advanced [id number] metastatic supraglottic squamous cell carcinoma     Trach in place, airway controlled  Continue home trach care    I have discussed findings with Dr. Foster  There is nothing more surgically that I can offer Gómez.  He has diffuse mucosal oozing from his primary tumors.  I told him he will continue to have mucosal bleeding.    Monitor h/h through oncology and palliative care      He declined a trach change today    Palliative consult requested by [facility] and placed    Follow-up as needed          Niki Mcdonald D.O.  Otolaryngology/Head and Neck Surgery  Allergy

## 2023-10-09 NOTE — PATIENT INSTRUCTIONS
Follow up as needed.     Thank you for allowing Dr. Mcdonald and our ENT team to participate in your care.  If your medications are too expensive, please give the nurse a call.  We can possibly change this medication.  If you have a scheduling or an appointment question please contact our Health Unit Coordinator at their direct line [phone].   ALL nursing questions or concerns can be directed to your ENT nurse, Joe, at: [phone]

## 2023-10-11 NOTE — ED PROVIDER NOTES
History     Chief Complaint   Patient presents with    feeding tube problem     Needs the feeding tube pushed back in.     HPI  Gómez Keenan is a 58 year old male who is here today for his radiation treatment for throat cancer.  In the process his feeding tube was caught on something and pulled partially out.  He is here to have an x-ray completed to make sure the feeding tube is in appropriate position.  Denies fevers, chills, nausea, vomiting, diarrhea, and shortness of breath.    Allergies:  Allergies   Allergen Reactions    Aspirin Nausea     Per 4/10/19, allergy to aspirin-related medications.       Problem List:    Patient Active Problem List    Diagnosis Date Noted    Adjustment disorder with anxiety 09/07/2023     Priority: Medium    Chronic low back pain 09/07/2023     Priority: Medium    Osteoarthrosis 09/07/2023     Priority: Medium    Tinnitus 09/07/2023     Priority: Medium    Lung nodule 08/29/2023     Priority: Medium    Head and neck cancer (H) 08/25/2023     Priority: Medium    Severe protein-calorie malnutrition (H24) 08/23/2023     Priority: Medium     Formatting of this note might be different from the original. Pt meets ASPEN criteria for severe malnutrition in the context of acute / likely chronic illness. See Dietitian entry 8/23 for supporting information and plan of care.      Chronic obstructive pulmonary disease with (acute) exacerbation (H) 08/22/2023     Priority: Medium    Hypertension 08/22/2023     Priority: Medium    Mass of neck 08/22/2023     Priority: Medium    Supraglottic airway obstruction 08/22/2023     Priority: Medium    Hyponatremia 01/08/2015     Priority: Medium    Serrated adenoma of colon 09/11/2014     Priority: Medium    Pericarditis 11/30/2012     Priority: Medium    Tobacco use disorder 01/08/2007     Priority: Medium    Insomnia, unspecified 01/30/2004     Priority: Medium     Formatting of this note might be different from the original. He will go to sleep for  an hour and then wakes up, he can use Lorazepam at bedtime. Updated per 10/1/17 IMO import          Past Medical History:    Past Medical History:   Diagnosis Date    Adjustment disorder with anxiety 09/07/2023    Alcoholic hepatitis (H28) 2015    Anemia     CHF (congestive heart failure) (H) 11/23/2012    Chronic low back pain 09/07/2023    Chronic obstructive pulmonary disease with (acute) exacerbation (H) 08/22/2023    Esophageal varices (H)     GI bleed 2004    Head and neck cancer (H) 08/25/2023    Hypertension 08/22/2023    Hyponatremia 01/08/2015    Insomnia, unspecified 01/30/2004    Lung nodule 08/29/2023    Open fracture of finger, distal phalanx 2013    Osteoarthritis 2019    Osteoarthrosis 09/07/2023    Peptic ulcer disease 2012    Pericardial effusion 2012    Pericarditis 11/30/2012    Serrated adenoma of colon 09/11/2014    Severe protein-calorie malnutrition (H24) 08/23/2023    Supraglottic airway obstruction 08/22/2023    Tinnitus 09/07/2023    Tobacco use disorder 01/08/2007       Past Surgical History:    Past Surgical History:   Procedure Laterality Date    BRONCHOSCOPY  09/06/2023    COLONOSCOPY W/ POLYPECTOMY  09/08/2014    CYST REMOVAL Left     Excision of sebaceous cyst from his left ear    ENDOBRONCHIAL ULTRASOUND  09/06/2023    ESOPHAGUS SURGERY      Esophageal varices laser repair without any f/u endoscopy    KNEE SURGERY      Repair of a laceration to his left knee at age 9    OTHER SURGICAL HISTORY  05/22/2019    Excision soft tissue mass @ posterior neck    TRACHEOSTOMY  08/22/2023    TRACHEOSTOMY, Direct laryngoscopy with biopsy    UPPER GI ENDOSCOPY  01/23/2004    Esophagogastroduodenoscopy with biopsy, Upper GI bleed with gastritis and [place] tear       Family History:    Family History   Problem Relation Age of Onset    Throat cancer Mother         secondary to alcohol       Social History:  Marital Status:   [2]  Social History     Tobacco Use    Smoking status:  "Former     Packs/day: 1.50     Years: 35.00     Additional pack years: 0.00     Total pack years: 52.50     Types: Cigarettes     Quit date: 2023     Years since quittin.1    Smokeless tobacco: Never   Vaping Use    Vaping Use: Never used   Substance Use Topics    Alcohol use: Not Currently     Comment: sober for 8-9 years    Drug use: Never        Medications:    acetaminophen (TYLENOL) 500 MG tablet  albuterol (ACCUNEB) 0.63 MG/3ML neb solution  diphenhydrAMINE (BENADRYL) 50 MG capsule  hydrOXYzine (ATARAX) 10 MG tablet  nicotine (NICODERM CQ) 21 MG/24HR 24 hr patch  HYDROcodone-acetaminophen 7.5-325 MG/15ML solution          Review of Systems   Constitutional:  Positive for activity change. Negative for chills and fever.   Respiratory:  Negative for shortness of breath.    Gastrointestinal:  Negative for abdominal pain, diarrhea, nausea and vomiting.   Neurological:  Negative for headaches.       Physical Exam   BP: (!) 119/44  Pulse: 68  Temp: 98.6  F (37  C)  Resp: 16  Height: 165.1 cm (5' 5\")  SpO2: 96 %      Physical Exam  Vitals and nursing note reviewed.   Constitutional:       General: He is not in acute distress.     Appearance: He is underweight.   HENT:      Head: Normocephalic.   Cardiovascular:      Rate and Rhythm: Normal rate.   Pulmonary:      Effort: Pulmonary effort is normal.   Abdominal:      Tenderness: There is no abdominal tenderness.   Skin:     General: Skin is warm and dry.   Neurological:      Mental Status: He is alert and oriented to person, place, and time.   Psychiatric:         Behavior: Behavior normal.         ED Course                 Procedures             Results for orders placed or performed during the hospital encounter of 10/11/23 (from the past 24 hour(s))   XR Abdomen 1 View    Narrative    PROCEDURE:  XR ABDOMEN 1 VIEW    HISTORY:  check feeding tube placement    TECHNIQUE:  Single radiograph of the abdomen.    COMPARISON:  None.    FINDINGS:     Mild mild " blunting of the left costophrenic angle. No subdiaphragmatic  air is seen.    No dilated loops of small bowel or air-fluid levels are seen.    A weighted feeding tube terminates near the midline in the upper  abdomen.      Impression    IMPRESSION:    A weighted feeding tube terminates near the midline in the upper  abdomen.    KRISHAN FERNANDEZ MD         SYSTEM ID:  [id number]       Medications - No data to display    Assessments & Plan (with Medical Decision Making)     I have reviewed the nursing notes.    I have reviewed the findings, diagnosis, plan and need for follow up with the patient.  (T84.630Q) Feeding tube dysfunction, initial encounter  Comment: 58 year old male who is here today for his radiation treatment for throat cancer.  In the process his feeding tube was caught on something and pulled partially out.  He is here to have an x-ray completed to make sure the feeding tube is in appropriate position.  Denies fevers, chills, nausea, vomiting, diarrhea, and shortness of breath.    MDM: Feeding tube remains in nasal cavity. No coughing or shortness of breath. No discomfort noted.  He pushed the feeding tube back into the nasal cavity/ esophagus and back into the abdomen to the gómez on the feeding tube for placement  I taped the feeding tube to his nose    Abdominal x-ray reviewed and per radiology;A weighted feeding tube terminates near the midline in the upper abdomen.    Spoke with Dr. Fernandez.  Is possible the feeding tube could be in the proximal portion of the duodenum of the small intestine    Discussed the x-ray with Gómez.     Plan: If you have any excess coughing or shortness of breath, fevers, or abdominal pain during feeding need to stop the feeding and go in and be reevaluated ER or whoever you follow for care of your feeding tube-feeding tube placement.  These discharge instructions and medications were reviewed with him and understanding verbalized.    This document was prepared using a  combination of typing and voice generated software.  While every attempt was made for accuracy, spelling and grammatical errors may exist.    Discharge Medication List as of 10/11/2023 11:52 AM          Final diagnoses:   Feeding tube dysfunction, initial encounter       10/11/2023   [facility]         Yanira Edwards, CNP  10/11/23 4749

## 2023-10-11 NOTE — DISCHARGE INSTRUCTIONS
If you have any excess coughing or shortness of breath, fevers, or abdominal pain during feeding need to stop the feeding and go in and be reevaluated ER or whoever you follow for care of your feeding tube-feeding tube placement.

## 2023-10-11 NOTE — ED TRIAGE NOTES
Patient presents to urgent care for feeding tube that came out. Patient states his BP is usually low. Provider notified.

## 2023-10-12 NOTE — PROGRESS NOTES
[facility]  DEPARTMENT OF RADIATION ONCOLOGY   SIMULATION NOTE    Name: Anali Keenan MRN: [id number]   : 1965 (58 year old)  Date of Service: Oct 12, 2023        Diagnosis and Cancer Staging   Head and neck cancer (H)  Staging form: Larynx - Glottis, AJCC 8th Edition  - Clinical: No stage assigned - Unsigned       Procedure   For non-SBRT treatment, the patient comes to clinic for simulation and radiation therapy for treatment as specified on the written consent (site of treatment, type of cancer). Therapy, Treatment Planning, and I reviewed the anticipated radiation therapy, clinical history and documentation, and radiographic information and images. We verified written consent for treatment. With the patient, we verified their identification, site, and side of treatment. We evaluated multiple setup positions and elected to simulate the patient in a modified supine position. We used orthogonal lasers to align them with the CT simulator. We immobilized the patient with a customized facemask and accessories to improve the reproducibility and safety for daily radiation therapy. We placed radiopaque markers to assist in identifying topographical landmarks for simulation. We obtained  and axial CT imaging through the target region. We used virtual simulation techniques to verify the adequacy of the CT images and to create a preliminary setup isocenter. Motion management was not utilized. We placed tattoos to anali the setup isocenter. The patient tolerated the procedure well and without complications. We will use available diagnostic and radiation therapy imaging studies for CT-based treatment planning with image fusion as indicated. I anticipate utilizing a form of intensity-modulated or 3D-conformal radiation therapy to develop a computerized treatment plan whose dosimetric analysis (e.g., dose-volume histogram (DVH)) indicates adequate coverage of target tissues and sparing of nearby  normal structures. We will complete routine QA procedures. The patient wished to proceed as recommended. We answered all questions to his satisfaction.    Implanted Cardiac Devices: None.    NOTE: Patient was re-simulated today due to noted changes of the neck. Neck appears larger than previous exam, and on image match.  Re-simulation completed to assure adequate coverage of target/treatment volume.     LORRAINE Tony CNP   Department of Radiation Oncology  Tel: [phone]  Fax: [phone]      Alicia Foster MD  Radiation Oncologist    I saw this patient while providing locum tenens coverage.

## 2023-10-13 NOTE — PROGRESS NOTES
Progress Notes  Encounter Date: Oct 13, 2023  LORRAINE Tony [facility]     RADIATION ONCOLOGY WEEKLY MANAGEMENT PROGRESS NOTE     Patient Care Team         Relationship Specialty Notifications Start End    No Ref-Primary, Physician PCP - General   8/8/23     Fax: [phone]         Charles Sen MD MD Hematology & Oncology  9/13/23     Phone: [phone] Fax: [phone] [address] [facility] [address]    Alicia Foster MD Assigned Cancer Care Provider   9/23/23     Phone: [phone] Fax: [phone]         [address] [address]                    DIAGNOSIS:  Cancer Staging   Head and neck cancer (H)  Staging form: Larynx - Glottis, AJCC 8th Edition  - Clinical: No stage assigned - Unsigned          RADIATION THERAPY:    Gómez Keenan has received 1540 cGy to date to SIB Larynx neck LNs.   Treatment 7/30  Total planned dose: 6600 cGy      SUBJECTIVE:    When patient was positioned to a supine for treatment, he began having a difficult time breathing due to thick secretions within his trach.  Patient was set up to a sitting position, was unable to clear his secretions, he was brought into an exam room and deep suctioned, O2 and HR remained stable. After several passes, patient was able to breath better. Offered to bring patient to ED for further suctioning, potential medication intervention, and fluids patient declined.       OBJECTIVE:    WEIGHT: 0 lbs 0 oz. Pulse 95   SpO2 95%   Examination reveals an alert male patient, respirations nonlabored after suction.  Heart rate and oxygen saturation stable      IMPRESSION:  Routine tolerance to radiation therapy.       PLAN:  Continue treatment as planned. Offer to set patient up for out patient fluids, patient denied.  Educated patient on the importance of pushing fluids to remain hydrated, and help thin out the mucus secretions.  Patient reports understanding, states he will drink more water.  Liquid Mucinex sent to patient's  preferred pharmacy, again to help thin secretions.  Educated patient on the importance of using his home nebulizer and suction device.  Also encourage patient to bring his suction with him to treatment especially due to the long distance he travels.  Educated patient to seek medical care with any new or worsening symptoms. patient states understanding denies questions or concerns at this time.         Medical record and imaging reviewed by covering locum provider.      LORRAINE Tony CNP

## 2023-10-17 NOTE — PROGRESS NOTES
Progress Notes  Encounter Date: Oct 17, 2023  LORRAINE Tony [facility]     RADIATION ONCOLOGY WEEKLY MANAGEMENT PROGRESS NOTE     Patient Care Team         Relationship Specialty Notifications Start End    No Ref-Primary, Physician PCP - General   8/8/23     Fax: [phone]         Charles Sen MD MD Hematology & Oncology  9/13/23     Phone: [phone] Fax: [phone] [address] [facility] [address]    Alicia Foster MD Assigned Cancer Care Provider   9/23/23     Phone: [phone] Fax: [phone]         [address] [address]                    DIAGNOSIS:  Cancer Staging   Head and neck cancer (H)  Staging form: Larynx - Glottis, AJCC 8th Edition  - Clinical: No stage assigned - Unsigned          RADIATION THERAPY:    Gómez Keenan has received 1760 cGy to date to Larynx neck LNs.   Treatment 8/30  Total planned dose: 6600 cGy      SUBJECTIVE:    Currently he reports doing well, reports using his nebulizer 2 times per day, did not  his Mucinex from the pharmacy.  Patient does feel slightly off when standing up his blood pressure is low today, he is on his way to chemotherapy after radiation and plans to get fluids.  Feels his mucus is less thick than previously.  He does report his throat feels irritated, denies any pain.      OBJECTIVE:    WEIGHT: 108 lbs 11.2 oz. BP (!) 88/48 (BP Location: Left arm, Patient Position: Chair, Cuff Size: Adult Regular)   Pulse 72   Temp 97.1  F (36.2  C) (Tympanic)   Resp 16   Wt 49.3 kg (108 lb 11.2 oz)   SpO2 96%   BMI 18.09 kg/m    Examination reveals an alert not acutely ill appearing male patient, tracheostomy present.  Mild to moderate right neck skin erythema, no desquamation.      IMPRESSION:  Routine tolerance to radiation therapy.       PLAN:  Continue treatment as planned.  Encourage patient to increase his NG feedings, in addition encouraged patient to push more fluids through his NG tube rather than relying on  oral intake.  Encourage patient to have scheduled IV fluid infusions, patient states he will have the fluids given with chemotherapy, otherwise plans to increase his fluid intake on his own.       Medical record and imaging reviewed by covering locum provider.      LORRAINE Tony CNP, MD  Radiation Oncologist

## 2023-10-18 NOTE — TELEPHONE ENCOUNTER
Called patient due to no show. Left message to call us back if he is still planning on coming in today so we can move his appointment.

## 2023-10-24 NOTE — PATIENT INSTRUCTIONS
Baking Soda Mouthwash     Mix together: 1 tbs baking soda, 1 tbs salt, and 1-quart water     Swish, gargle, and spit 4 to 6 times per day to help with mouth sores, or pain     Magic Mouthwash     An order has been sent to your preferred pharmacy  You may swish and spit OR swish and swallow if esophageal involvement.   You may use this 4-6 times per day.  Use 20-30 minutes prior to meals to help with oral/ esophageal pain or as needed for oral pain  Please be aware that your mouth will be numb, and foods likely will not taste correctly  The numbing sensation may last up to 2 hours

## 2023-10-24 NOTE — PROGRESS NOTES
"Progress Notes  Encounter Date: Oct 24, 2023  LORRAINE Tony [facility]     RADIATION ONCOLOGY WEEKLY MANAGEMENT PROGRESS NOTE     Patient Care Team         Relationship Specialty Notifications Start End    No Ref-Primary, Physician PCP - General   8/8/23     Fax: [phone]         Charles Sen MD MD Hematology & Oncology  9/13/23     Phone: [phone] Fax: [phone] [address] [facility] [address]    Alicia Foster MD Assigned Cancer Care Provider   9/23/23     Phone: [phone] Fax: [phone]         [address] [facility] [address]                  DIAGNOSIS:  Cancer Staging   Head and neck cancer (H)  Staging form: Larynx - Glottis, AJCC 8th Edition  - Clinical: No stage assigned - Unsigned          RADIATION THERAPY:    Gómez Keenan has received 3080 cGy to date to SIB Larynx neck LNs.   Treatment 14/30  Total planned dose: 6600 cGy        SUBJECTIVE:    Gómez reports that he is struggling greatly this week with multiple side effects. He has started experiencing a sore throat and cough that has been keeping him up at night. Erythema present in treatment region and Gómez states it is \"itchy\" as well.  Currently using Miaderm in treatment region which he reports is helpful.  In addition, he is struggling with dry mouth and a thicker saliva, he is trying to increase water intake by sipping on water throughout the day.  He has also noticed \"white bumps\" in his mouth and is wondering if this is caused by his dentures as they have not been fitting very well. He is having nausea on and off throughout the day and does not have any home medication to help with the nausea and is hoping to have something prescribed today. Also noted, Gómez commented that during the treatment, while laying on his back, he is struggling to breathe. He feels he has been more fatigued and \"bored\" at home, especially since he has been unable to due most activities without getting short of breath and comments " "\"I just sit around all day and watch TV.\"       OBJECTIVE:    WEIGHT: 107 lbs 3.2 oz. /62 (BP Location: Right arm, Patient Position: Chair, Cuff Size: Adult Regular)   Pulse 70   Temp 97.6  F (36.4  C) (Tympanic)   Resp 17   Wt 48.6 kg (107 lb 3.2 oz)   SpO2 97%   BMI 17.84 kg/m    Examination reveals an alert male patient, respirations non labored, mild erythema to neck region.       IMPRESSION:  Routine tolerance to radiation therapy.       PLAN:  Continue treatment as planned.  Educated patient on the importance of pushing fluids to remain hydrated, and help thin out the mucus secretions.   Sore throat/ Cough:  Order for Gelclair and MMW sent to preferred pharmacy. Instructions for baking soda mouth rise given to patient.    Nausea: compazine q. 8 hours sent to preferred pharmacy          Medical record and imaging reviewed by covering locum provider.      LORRAINE Tony CNP    Alicia Foster MD  Radiation Oncologist    "

## 2023-10-24 NOTE — PROGRESS NOTES
Care Coordination Note:    Writer met with patient after his radiation therapy. Explained role in the unit. Patient identified that he had concerns about financials since he is not able to work during his treatments. We discussed several options, we filled out [employer] application, and different grants he can get through [facility]. Writer filled out lucero application online for patient. We also discussed applying for energy assistance.   Writer will give him EA application and [employer] line number tomorrow after his treatment.  Will follow as needed.    PRITI Chandler

## 2023-11-01 NOTE — PROGRESS NOTES
Progress Notes  Encounter Date: Nov 1, 2023  LORRAINE Tony [facility]     RADIATION ONCOLOGY WEEKLY MANAGEMENT PROGRESS NOTE     Patient Care Team         Relationship Specialty Notifications Start End    No Ref-Primary, Physician PCP - General   8/8/23     Fax: [phone]         Charles Sen MD MD Hematology & Oncology  9/13/23     Phone: [phone] Fax: [phone] [address] [facility] [address]    Alicia Foster MD Assigned Cancer Care Provider   9/23/23     Phone: [phone] Fax: [phone]         [address] [facility] [address]                  DIAGNOSIS:  Cancer Staging   Head and neck cancer (H)  Staging form: Larynx - Glottis, AJCC 8th Edition  - Clinical: No stage assigned - Unsigned          RADIATION THERAPY:    Gómez Keenan has received 3960 cGy to date to SIB Larynx neck LNs.   Treatment 18/30  Total planned dose: 6600 cGy        SUBJECTIVE:    Gómez reports he still has a sore throat.  He was not able to get MWW filled last week at his pharmacy because they didn't have all the ingredients.  This Rx will be sent to [facility].   If they are not able to fill it, he was instructed to inform us.  He is no longer able to eat by mouth, but can sip on liquids.  Ensure makes the secretions thicker.  Discussed trying Ensure Clear.  He does not think he will be able to afford this.  [employer] Application completed and submitted.  He is doing his tube feedings three times a day.  He does have nausea on and off throughout the day.  On occasion he will vomit small amounts that he describes as yellow in color.  He is taking Compazine 1-2 times a day.  Reminded him that he can take it every 8 hours.  Erythema present in treatment region and he continues to apply Miaderm and it is soothing.   He has difficulties breathing lying on his back due to secretions and is sleeping with head elevated. He is trying to stay hydrated as this makes the secretions more manageable.  He is  also using his nebulizer.        OBJECTIVE:    WEIGHT: 105 lbs 0 oz. /64 (BP Location: Left arm, Patient Position: Chair, Cuff Size: Adult Regular)   Pulse 68   Temp 97.3  F (36.3  C) (Tympanic)   Resp 16   Wt 47.6 kg (105 lb)   SpO2 91%   BMI 17.47 kg/m    Examination reveals an alert male patient, respirations non labored, mild erythema to neck region.       IMPRESSION:  Routine tolerance to radiation therapy.       PLAN:  Continue treatment as planned.  Educated patient on the importance of pushing fluids to remain hydrated, and help thin out the mucus secretions.   Sore throat/ Cough: MMW re ordered   Nausea: continue compazine q. 8 hours PRN  Weight loss: Nutrition referral placed, order for Ensure sent to pharmacy          Medical record and imaging reviewed by covering locum provider.      LORRAINE Tony CNP    Alicia Foster MD  Radiation Oncologist

## 2023-11-02 NOTE — PROGRESS NOTES
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     Wt Readings from Last 10 Encounters:   11/01/23 47.6 kg (105 lb)   10/24/23 48.6 kg (107 lb 3.2 oz)   10/17/23 49.3 kg (108 lb 11.2 oz)   10/09/23 49.4 kg (109 lb)   10/09/23 49.4 kg (109 lb)   09/14/23 48.2 kg (106 lb 4.8 oz)      Nutrition History:  Pt reports he is tolerating the tube feeds well. Nausea at times, on occasion may have emesis. He has compazine at home that helps. Original home TF recommendations was 5 cartons of Jevity 1.5 per day. He is losing weight so he has begun to increase the amount of formula in small increments. Swallowing has become more difficult. He tries to sip on liquids but even that is hard. Radiation oncology provider sent Ensure clear prescription to his pharmacy.    Food Record:  Minimal intake by mouth, sips on some liquids  Jevity 1.5- bolus 3x per day. has been gradually increasing- up to about 5.5 cartons per day. 180ml water before and after boluses (TID)    Physical Activity:  Not addressed today    Medical History/Family History:  CHF, COPD, HTN, malnutrition, tobacco use disorder, alcoholic hepatitis    Estimated Nutrition Needs: IBW 59.2 kg  Estimated Energy Needs: 1775-2072kcal (30-35kcal/kg)  Estimated Protein Needs: 70-105g (1.2-1.8 g/kg)  Estimated Fluid Needs: 1775-2072ml (1ml/kcal)    Nutrition Education:  Increasing volume of formula. Decrease volume of water flushes with formula bolus and add water flush in between. Sip on fluids as able- water, hydration beverages, Ensure clear. Provided with list of soft/moist high protein foods.    Nutrition " Goals:  Adequate enteral/oral intake to promote weight gain/minimize weight loss    Nutrition Follow-up/ Monitoring:  Intake, weight, labs    Nutrition Recommendations:  Jevity 1.5- 6 cartons per day. 2 cartons TID.  90ml water flush before and after bolus. 250ml water boluses BID to meet fluid needs.  This provides 2130kcal, 90.6g protein, 2120ml free water, 31.8g fiber    Follow up with RD in 1-2 weeks.   Patient has RD's contact information to call/email if needed.    Time spent with pt - 15 min    Sarah Sims RD

## 2023-11-07 NOTE — PROGRESS NOTES
Progress Notes  Encounter Date: Nov 7, 2023  LORRAINE Tony [facility]     RADIATION ONCOLOGY WEEKLY MANAGEMENT PROGRESS NOTE     Patient Care Team         Relationship Specialty Notifications Start End    No Ref-Primary, Physician PCP - General   8/8/23     Fax: [phone]         Charles Sen MD MD Hematology & Oncology  9/13/23     Phone: [phone] Fax: [phone] [address] [facility] [address]    Alicia Foster MD Assigned Cancer Care Provider   9/23/23     Phone: [phone] Fax: [phone]         [address] [facility] [address]                  DIAGNOSIS:  Cancer Staging   Head and neck cancer (H)  Staging form: Larynx - Glottis, AJCC 8th Edition  - Clinical: No stage assigned - Unsigned          RADIATION THERAPY:    Gómez Keenan has received 4840 cGy to date to SIB Larynx neck LNs.   Treatment 22/30  Total planned dose: 6600 cGy        SUBJECTIVE:    Gómez reports he did not like the MMW.  He does have sore throat and is taking sips of water.  He feels his sore throat is improved compared to last week.  He is relying on his NG tube. He continues to have nausea and this is controlled with Compazine.  Erythema present in treatment region and he continues to apply Miaderm.  Secretions from trach continue to be thick, but he is able to clear them.  He does feel lightheaded and dizzy on occasion when he changes positions.  He continues to get free water through his NG tube.  He is not sleeping well at night.        OBJECTIVE:    WEIGHT: 106 lbs 3.2 oz. /64 (BP Location: Left arm, Patient Position: Chair, Cuff Size: Adult Regular)   Pulse 82   Temp 97.4  F (36.3  C) (Tympanic)   Resp 16   Wt 48.2 kg (106 lb 3.2 oz)   SpO2 95%   BMI 17.67 kg/m    Examination reveals an alert male patient, respirations non labored, mild erythema in neck region.       IMPRESSION:  Routine tolerance to radiation therapy.       PLAN:  Continue treatment as planned.  Educated patient on  the importance of pushing fluids to remain hydrated, and help thin out the mucus secretions.   Sore throat/ Cough: Patient has MMW  Nausea: continue compazine q. 8 hours PRN  Weight loss: Meeting with dietician, has ensure, planning to increase feedings. Planning to reach out to medical oncology to discuss additional IV fluids          Medical record and imaging reviewed by covering locum provider.      LORRAINE Tony CNP    Alicia Foster MD  Radiation Oncologist

## 2023-11-10 NOTE — PROGRESS NOTES
Called Gómez per therapist request as he did not show up for radiation treatment. Patient cancelled treatment today via [employer]. Gómez reports he does not want to come to treatment today as he did not get any sleep last night as he was nauseous and out of his antinausea medications. He had them refilled today and plans on resting today. He will resume treatment on Monday. Radiation therapists and CNP notified.

## 2023-11-15 NOTE — PROGRESS NOTES
Progress Notes  Encounter Date: Nov 15, 2023  LORRAINE Tony [facility]     RADIATION ONCOLOGY WEEKLY MANAGEMENT PROGRESS NOTE     Patient Care Team         Relationship Specialty Notifications Start End    No Ref-Primary, Physician PCP - General   8/8/23     Fax: [phone]         Charles Sen MD MD Hematology & Oncology  9/13/23     Phone: [phone] Fax: [phone] [address] [facility] [address]    Alicia Fsoter MD Assigned Cancer Care Provider   9/23/23     Phone: [phone] Fax: [phone]         [address] [facility] [address]                  DIAGNOSIS:  Cancer Staging   Head and neck cancer (H)  Staging form: Larynx - Glottis, AJCC 8th Edition  - Clinical: No stage assigned - Unsigned          RADIATION THERAPY:    Gómez Keenan has received 5720 cGy to date to SIB Larynx neck LNs.   Treatment 26/30  Total planned dose: 6600 cGy        SUBJECTIVE:    Gómez continues to have a sore throat and is taking sips of water.  His tube feedings are going well and his weight is up 2 lbs from last week.  He was given a prescription for Ativan yesterday at [facility] and reports this is working well for nausea and sleep.  He normally wakes up after 2 hours and last night he slept for 4 hours.  He is still using Compazine in the morning because he does not feel comfortable driving after taking Ativan.  Erythema present in treatment region and he continues to apply Miaderm.  Secretions from trach continue to be thick, but he is able to clear them. He suctions at night before bed.      OBJECTIVE:    WEIGHT: 108 lbs 0 oz. /62 (BP Location: Right arm, Patient Position: Chair, Cuff Size: Adult Regular)   Pulse 78   Temp 97.1  F (36.2  C) (Tympanic)   Resp 16   Wt 49 kg (108 lb)   SpO2 96%   BMI 17.97 kg/m    Examination reveals an alert male patient, respirations non labored, mild erythema in neck region.       IMPRESSION:  Routine tolerance to radiation therapy.       PLAN:   Continue treatment as planned.  Continue pushing fluids to remain hydrated, and help thin out the mucus secretions.   Sore throat/ Cough: Patient has MMW  Nausea: continue compazine q. 8 hours PRN, and Ativan  Weight loss: Meeting with dietician, has ensure, planning to increase feedings. Planning to reach out to medical oncology to discuss additional IV fluids          Medical record and imaging reviewed by covering locum provider.      LORRAINE Tony CNP    Alicia Foster MD  Radiation Oncologist

## 2023-11-21 NOTE — PROGRESS NOTES
Gómez Keenan  Gender: male  : 1965  Medical Record: [id number]  Primary Care Provider: Dr. Taco Silverio Physician    REFERRING PHYSICIANS: Dr. Danielle, Dr. Sen     DIAGNOSIS: Head and neck cancer (H)    TREATMENT INTENT: curative   AREA TREATED: Larynx and neck LNs  PRIMARY TREATMENT TECHNIQUE: VMAT  ENERGY: 6X  TUMOR DOSE: 6600 cGy  NUMBER OF TREATMENTS: 30    ELAPSED CALENDAR DAYS: 48  COMPLETION DATE: 2023       Olena Rivera DNP, APRN, FNP-C   Department of Radiation Oncology

## 2023-11-21 NOTE — NURSING NOTE
Order for IV fluids faxed to [facility] infusion dept.  Spoke with infusion nurse, Jameel, and she states they do have openings tomorrow.  They will contact the patient.

## 2023-11-21 NOTE — PROGRESS NOTES
"Progress Notes  Encounter Date: Nov 21, 2023  LORRAINE Tony [facility]     RADIATION ONCOLOGY WEEKLY MANAGEMENT PROGRESS NOTE     Patient Care Team         Relationship Specialty Notifications Start End    No Ref-Primary, Physician PCP - General   8/8/23     Fax: [phone]         Charles Sen MD MD Hematology & Oncology  9/13/23     Phone: [phone] Fax: [phone] [address] [facility] [address]    Alicia Foster MD Assigned Cancer Care Provider   9/23/23     Phone: [phone] Fax: [phone]         [address] [facility] [address]                  DIAGNOSIS:  Cancer Staging   Head and neck cancer (H)  Staging form: Larynx - Glottis, AJCC 8th Edition  - Clinical: No stage assigned - Unsigned          RADIATION THERAPY:    Gómez Keenan has received 6600 cGy to date to SIB Larynx neck LNs.   Treatment 30/30  Total planned dose: 6600 cGy        SUBJECTIVE:    Gómez reports his sore throat is worse this week and would like pain medications. He has not been eating orally due to his sore throat and having a metallic taste but is continuing to have tube feedings for nutrition. Reports MMW is not helpful to him. He is down 4 lbs since last week. He reports his nausea has been \"pretty bad\" and is using Compazine in the morning and as needed which is helpful. He is also taking Ativan before bed to help with sleep/nausea which he is unsure if this is helpful.  Erythema present in treatment region and he continues to apply Miaderm, feels this works \"okay\" and does not want something different.  Secretions from trach continue to be thick, but he is able to clear them and suctions at night before bed.     OBJECTIVE:    WEIGHT: 104 lbs 0 oz. /68 (BP Location: Left arm, Patient Position: Chair, Cuff Size: Adult Regular)   Pulse 80   Temp 97.1  F (36.2  C) (Tympanic)   Resp 24   Wt 47.2 kg (104 lb)   SpO2 93%   BMI 17.31 kg/m    Examination reveals an alert male patient, " respirations non labored, mild erythema in neck region.       IMPRESSION:  Routine tolerance to radiation therapy.       PLAN:  Radiation completion today  Continue pushing fluids to remain hydrated, and help thin out the mucus secretions. IV Fluids ordered and sent to [facility]   Sore throat/ Cough: Patient has MMW, liquid hydrocodone- discussed starting with half of a dose (as patient is concerned about his tolerance)   Nausea: continue compazine q. 8 hours PRN, and Ativan  Weight loss: Meeting with dietician, has ensure, planning to increase feedings.   Follow up with medical oncology          Medical record and imaging reviewed by covering locum provider.      LORRAINE Tony CNP    Alicia Foster MD  Radiation Oncologist

## 2023-11-22 NOTE — PROGRESS NOTES
Called patient as requested after completion of radiation therapy.  Patient reports that he is doing well, symptoms persist as expected.  Patient was afraid to use his liquid hydrocodone as he feeling worried about the side effects.  I instructed patient to try half of the dose initially to see how he tolerates it.  Patient states he tolerated the medication well, denies side effects from it at this time, and felt that it provided some relief.  Instructed patient that he can continue to use this as his pain persists, we talked about getting ahead of the pain.  Patient has an infusion for IV fluids today at 2:00 in [place].  Patient states he will call with any further questions or concerns.    Olena Rivera DNP, APRN, FNP-C   Department of Radiation Oncology

## 2023-12-18 NOTE — TELEPHONE ENCOUNTER
Completed radiation therapy 11/21/23 for laryngeal cancer.  Follow up call made and spoke with patient.  He still is not able to eat.  He states that food does not want to go down.  He is relying on his NG tube and thinks he is maintaining his weight.  His throat pain is controlled with liquid hydrocodone.  Encouraged him to do his swallowing exercises that he learned from the speech therapist.  He is fatigued. He had a CT of his neck, chest, abdomen, and pelvis today and will be seeing Dr. Sen for a follow up next week.  He has no further questions or concerns.    Chelsea Trejo RN

## 2024-01-01 ENCOUNTER — TELEPHONE (OUTPATIENT)
Dept: OTOLARYNGOLOGY | Facility: OTHER | Age: 59
End: 2024-01-01

## 2024-01-01 ENCOUNTER — OFFICE VISIT (OUTPATIENT)
Dept: OTOLARYNGOLOGY | Facility: OTHER | Age: 59
End: 2024-01-01
Attending: OTOLARYNGOLOGY
Payer: COMMERCIAL

## 2024-01-01 VITALS
BODY MASS INDEX: 16.66 KG/M2 | HEART RATE: 121 BPM | WEIGHT: 100 LBS | TEMPERATURE: 97.3 F | SYSTOLIC BLOOD PRESSURE: 102 MMHG | RESPIRATION RATE: 22 BRPM | DIASTOLIC BLOOD PRESSURE: 73 MMHG | HEIGHT: 65 IN | OXYGEN SATURATION: 94 %

## 2024-01-01 DIAGNOSIS — Z92.3 HISTORY OF RADIATION TO HEAD AND NECK REGION: ICD-10-CM

## 2024-01-01 DIAGNOSIS — C32.1 PRIMARY SQUAMOUS CELL CARCINOMA OF SUPRAGLOTTIS (H): ICD-10-CM

## 2024-01-01 DIAGNOSIS — R13.14 PHARYNGOESOPHAGEAL DYSPHAGIA: ICD-10-CM

## 2024-01-01 DIAGNOSIS — Z71.6 TOBACCO ABUSE COUNSELING: ICD-10-CM

## 2024-01-01 DIAGNOSIS — Z43.0 TRACHEOSTOMY CARE (H): Primary | ICD-10-CM

## 2024-01-01 PROCEDURE — 99215 OFFICE O/P EST HI 40 MIN: CPT | Mod: 25 | Performed by: OTOLARYNGOLOGY

## 2024-01-01 PROCEDURE — 99417 PROLNG OP E/M EACH 15 MIN: CPT | Performed by: OTOLARYNGOLOGY

## 2024-01-01 PROCEDURE — 31575 DIAGNOSTIC LARYNGOSCOPY: CPT | Performed by: OTOLARYNGOLOGY

## 2024-01-01 RX ORDER — PROCHLORPERAZINE MALEATE 10 MG
10 TABLET ORAL
COMMUNITY
Start: 2024-01-01

## 2024-01-01 ASSESSMENT — PAIN SCALES - GENERAL: PAINLEVEL: MODERATE PAIN (5)

## 2024-02-08 NOTE — TELEPHONE ENCOUNTER
Faby RN at [facility], called regarding patient. Patient had trach placed in August 2023 and has not had it changed since. He was seen by Dr. Mcdonald in October 2023, at which time he declined a trach change. Patient is now requesting to be establish by [facility] for his trach cares. He has a Shiley cuffed 8 with a passey-gisselle valve. [facility]

## 2024-02-08 NOTE — TELEPHONE ENCOUNTER
He necessarily does not need a new ENT referral.  He does need to ensure that he has a correct trach supplies to bring with him.  Due to trach being in position for the first year without any recent change he should follow-up with Dr. Mcdonald initially for trach change with either Mirian or myself present.  Then we will continue with routine trach change TR

## 2024-02-09 NOTE — TELEPHONE ENCOUNTER
"Patient called back. Scheduled appointment with Dr. Mcdonald. Patient informed that he will need to bring a new trach with him of the same size he currently has, as well as a size smaller. Patient reports he doesn't have any trach supplies. [facility], but he said that got \"all screwed up\" because he went to the ER in [place]. This nurse will contact RT Britany for [facility] who can assist patient with getting trach supplies. Will also work on getting a care coordinator for the patient through the [facility].   "

## 2024-02-09 NOTE — TELEPHONE ENCOUNTER
Touched base with RT Lidia and Ashely, RN at the [facility] in [place], where the patient is established. Lidia informed this nurse that pt has all of the supplies he needs, except the size smaller trach (7-0 shiley) for a trach change. Lidia is assisting the patient to order the size smaller trach. Patient has not ordered new supplies for a while, but can still order more, when needed. Ashely is working on getting authorization for the patient to be seen by ENT in [place], as [facility] . Ashely states the authorization should go through in time for patient's upcoming appointment. Ashely and Lidia will be reaching out to the patient to discuss further, and will reinforcing education with the patient.    This nurse notified the patient via MyChart of the above conversation. Informed patient that he will need to remain off the wait list until confirmation of approval comes through. Instructed patient to go to the nearest Urgent Care or Emergency Room if needed for shortness of breath/difficulty breathing.

## 2024-03-01 NOTE — PATIENT INSTRUCTIONS
Thank you for allowing Dr. Mcdonald and our ENT team to participate in your care.  If your medications are too expensive, please give the nurse a call.  We can possibly change this medication.  If you have a scheduling or an appointment question please contact our Health Unit Coordinator at their direct line [phone].   ALL nursing questions or concerns can be directed to your ENT nurse, Joe, at: [phone]    Follow-up with General Surgery in [place] for consult for PEG tube or G-tube  Follow-up with [facility] in 2-3 months for trach change and possible cap off trial. We will try to downsize at that time. Bring a 6.5mm trach, cuffless with you next time.

## 2024-03-01 NOTE — LETTER
3/1/2024         RE: Gómez Keenan  [address]  [facility] [address]        Dear Colleague,    Thank you for referring your patient, Gómez Keenan, to the [facility]. Please see a copy of my visit note below.    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    Started palliative cisplatin concurrent with radiation on 10/4/2023  Radiation completed:  11/21/23   Still on chemo through Dr. Tank Leslie     Accompanied by Gosia, spouse  He has never had a PEG  NG tube placed in August, NG feedings, o/w NPO    He cannot tolerate anything by mouth    Currently 100 #, he has had a 10# weight loss over the past 3 months  No otalgia or pharyngitis    He presents today to request a trach change she is also requesting his nasogastric tube be removed.  [facility] [place]  We requested notes but I only have notes from his PA dated 7/2023.    CT neck and chest dated 2/27 show resolution of cervical lymphadenopathy, persistent laryngeal edema with resolution of bulky supraglottic mass.  There is no mention of cervical lymphadenopathy.  Progression of lung disease with increase size and number of nodules.    Images not yet loaded in PACS    I saw him at the urgent request of radiation oncology on 10/9/2023, and noted large fixed level 3 and 4 cervical lymphadenopathy.  Flexible laryngoscopy showed a fungating polypoid oozing tongue base mass.  No recognizable structures.  I could not visualize the epiglottis nor the cords.  He declined a trach change at his last visit.    Formally had an 8 cuffed Conchis    Followed by Olena Rivera, CNP tadiation oncology, and Dr. Foster    Followed by Dr. Danielle in [facility], 11/14 note reviewed    History:   He saw Dr. Danielle in oncology on 10/2/2023 who recommended palliative concurrent  chemoradiation  There is obvious concern for lung metastases     Dr. Foster in radiation therapy saw Gómez on 9/14/23     Ct neck 7/26/23 shows an large tongue base mass infiltrating the larynx extending through the vallecula and throughout the piriform sinuses.  Except extensive lymphadenopathy largest on the right measures 3.3 cm.     PET dated 8/18/23 shows an FDG avid supraglottic mass with bilateral cervical metastases and right supraclavicular metastases.  Mildly avid pulmonary nodules     Hx tobacco and history of etoh abuse , quit etoh x 8 years  52.5 pack year history of tobacco use.  He quit smoking last month.    PAST MEDICAL HISTORY as outlined by Dr. Danielle:   1. Metastatic head neck cancer  a. CT neck 7/24/2023 infiltrative mass in the larynx with extensive necrotic cervical adenopathy.  b. CT chest 7/24/2023 emphysematous changes with multiple indeterminate pulmonary nodules.  c. PET/CT 8/18/2023 FDG avid supraglottic mass with FDG avid bilateral cervical and right supraclavicular lymph nodes. Mildly FDG avid pulmonary nodules and hilar lymph nodes concerning for metastatic disease. Left lung pleural-based FDG uptake seems more suggestive of inflammatory etiology.  d. Urgent tracheostomy 8/22/2023 for management of acute hypoxic respiratory failure secondary to airway obstruction. Biopsy supraglottic mass demonstrated invasive moderately differentiated keratinizing squamous cell carcinoma. TEMPUS xT: FBXW7-LOF, PP59-RRR x2, CDKN2A and CDKN2B copy number loss. TMB 6.3. MSI-Stable. MMR proteins intact pMMR. PD-L1 TPS<1%, CPS-1 (positive).  e. Bronchoscopy 9/6/2023 right middle lobe lung fine-needle aspiration nodule demonstrate moderate differentiated keratinizing squamous cell carcinoma consistent with metastatic disease.  f. CT chest 8/28/2023 interval progression of presumed pulmonary metastatic disease.  g. Started palliative intent cisplatin concurrent with radiation on 10/4/2023.  h. Fiberoptic  "examination 10/9/2023 [facility] to evaluate hemoptysis demonstrated diffuse oozing and bilateral fungating polypoid masses at the base of tongue not felt to have good surgical options.       Physical Exam  /73 (BP Location: Left arm, Patient Position: Sitting, Cuff Size: Adult Regular)   Pulse (!) 121   Temp 97.3  F (36.3  C) (Temporal)   Resp 22   Ht 1.651 m (5' 5\")   Wt 50 kg (110 lb 3.2 oz)   SpO2 94%   BMI 18.34 kg/m    General - The patient is cachectic.  Alert and oriented to person and place, interactive.  Head and Face - Normocephalic and atraumatic, with no gross asymmetry noted of the contour of the facial features.  The facial nerve is intact, with strong symmetric movements.  Neck-resolution of bulky lymphadenopathy.  I cannot palpate any concerning lymphadenopathy.    Trach in position with excess secretions  PROCEDURE  Consent was obtained for tracheostomy replacement and risks including loss of airway were discussed.  The tracheostomy is uncuffed was removed and a new 8.5 Shiley deflated cuffed tube was placed.  The obturator was remove the inner cannula was placed.  There is no bleeding.  There is excess granulation tissue at the entire inferior margin of the stoma.  The Velcro trach collar was placed and trach sponge placed.  He tolerated this well.    Eyes - Extraocular movements intact.   Ears- External auditory canals are patent, tympanic membranes are intact without effusion or worrisome retractions   Nose - Nasal mucosa is pink and moist with no abnormal mucus.  NG tube left  Mouth - Examination of the oral cavity shows pink, healthy, moist mucosa.  No lesions or ulceration noted.  Edentulous.  the tongue is mobile and midline.  Throat - The walls of the oropharynx were smooth, pink, moist, symmetric, and had no lesions or ulcerations.  The tonsillar pillars and soft palate were symmetric.  The uvula was midline on elevation.  No oropharyngeal mass    Attempts at mirror " laryngoscopy were not possible due to gag reflex.  Therefore I proceeded with a fiberoptic examination after informed consent.  First I applied topical nasal lidocaine and neosynephrine.  I then passed the scope through the right nasal cavity.     The nasopharynx was mucosally covered and symmetric.  The eustachian tube openings were unobstructed.  Going further down I had a clear view of the base of tongue which had grade 2 lingual tonsillar tissue.  The base of tongue was grossly free of lesions.  The epiglottis was smooth and mucosally covered.  Resolution of the bulky supraglottic mass.  Excess secretions pooling throughout the supraglottis and covering the glottis.  The vocal cords are grossly symmetric without mass.  The false cords and remainder of the supraglottis have moderate edema and are covered with secretions.  The pyriform sinuses are pooling of secretions.  The patient tolerated the procedure well.      Impression/Plan  Gómez Keenan is a 58 year old male    ICD-10-CM    1. Tracheostomy care (H)  Z43.0 lidocaine 2%-oxymetazoline 0.025% nasal solution 2 spray      2. Primary squamous cell carcinoma of supraglottis (H)  C32.1       3. History of radiation to head and neck region  Z92.3       4. 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 Leslie, oncology, as scheduled  Continue [facility] [place], continue NPO  Improve home trach cares    Quit tobacco, still smoking 1/2 ppd     Schedule PEG or G tube [place] next week    Follow-up with General Surgery in [place] for consult for PEG tube or G-tube  Follow-up with RA[facility] Deal in 2-3 months for trach change and possible cap off trial. We will try to downsize at that time. Bring a 6.5mm trach, cuffless with you next time.     Consider trach capping  trial after next trach change    Continue [facility]    Total exam time spent on the day of the visit including review of the chart, reviewing pertinent prior imaging and notes, obtaining a detailed history and physical exam, education, discussion with the patient and documenting in epic was over 60 minutes .  This did not include procedure time.      Niki Mcdonald D.O.  Otolaryngology/Head and Neck Surgery  Allergy          Again, thank you for allowing me to participate in the care of your patient.        Sincerely,        Niki Mcdonald MD

## 2024-03-01 NOTE — PROGRESS NOTES
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    Started palliative cisplatin concurrent with radiation on 10/4/2023  Radiation completed:  11/21/23   Still on chemo through Dr. Tank Leslie     Accompanied by Gosia, spouse  He has never had a PEG  NG tube placed in August, NG feedings, o/w NPO    He cannot tolerate anything by mouth    Currently 100 #, he has had a 10# weight loss over the past 3 months  No otalgia or pharyngitis    He presents today to request a trach change she is also requesting his nasogastric tube be removed.  [facility] [place]  We requested notes but I only have notes from his PA dated 7/2023.    CT neck and chest dated 2/27 show resolution of cervical lymphadenopathy, persistent laryngeal edema with resolution of bulky supraglottic mass.  There is no mention of cervical lymphadenopathy.  Progression of lung disease with increase size and number of nodules.    Images not yet loaded in PACS    I saw him at the urgent request of radiation oncology on 10/9/2023, and noted large fixed level 3 and 4 cervical lymphadenopathy.  Flexible laryngoscopy showed a fungating polypoid oozing tongue base mass.  No recognizable structures.  I could not visualize the epiglottis nor the cords.  He declined a trach change at his last visit.    Formally had an 8 cuffed Conchis    [facility]    Followed by Dr. Danielle in [facility], 11/14 note reviewed    History:   He saw Dr. Danielle in oncology on 10/2/2023 who recommended palliative concurrent chemoradiation  There is obvious concern for lung metastases     Dr. Foster in radiation therapy saw Gómez on 9/14/23     Ct neck 7/26/23 shows an large tongue base mass infiltrating the larynx extending through the vallecula and throughout the piriform  sinuses.  Except extensive lymphadenopathy largest on the right measures 3.3 cm.     PET dated 8/18/23 shows an FDG avid supraglottic mass with bilateral cervical metastases and right supraclavicular metastases.  Mildly avid pulmonary nodules     Hx tobacco and history of etoh abuse , quit etoh x 8 years  52.5 pack year history of tobacco use.  He quit smoking last month.    PAST MEDICAL HISTORY as outlined by Dr. Danielle:   1. Metastatic head neck cancer  a. CT neck 7/24/2023 infiltrative mass in the larynx with extensive necrotic cervical adenopathy.  b. CT chest 7/24/2023 emphysematous changes with multiple indeterminate pulmonary nodules.  c. PET/CT 8/18/2023 FDG avid supraglottic mass with FDG avid bilateral cervical and right supraclavicular lymph nodes. Mildly FDG avid pulmonary nodules and hilar lymph nodes concerning for metastatic disease. Left lung pleural-based FDG uptake seems more suggestive of inflammatory etiology.  d. Urgent tracheostomy 8/22/2023 for management of acute hypoxic respiratory failure secondary to airway obstruction. Biopsy supraglottic mass demonstrated invasive moderately differentiated keratinizing squamous cell carcinoma. TEMPUS xT: FBXW7-LOF, HU40-MFN x2, CDKN2A and CDKN2B copy number loss. TMB 6.3. MSI-Stable. MMR proteins intact pMMR. PD-L1 TPS<1%, CPS-1 (positive).  e. Bronchoscopy 9/6/2023 right middle lobe lung fine-needle aspiration nodule demonstrate moderate differentiated keratinizing squamous cell carcinoma consistent with metastatic disease.  f. CT chest 8/28/2023 interval progression of presumed pulmonary metastatic disease.  g. Started palliative intent cisplatin concurrent with radiation on 10/4/2023.  h. Fiberoptic examination 10/9/2023 [facility] to evaluate hemoptysis demonstrated diffuse oozing and bilateral fungating polypoid masses at the base of tongue not felt to have good surgical options.       Physical Exam  /73 (BP Location: Left arm, Patient  "Position: Sitting, Cuff Size: Adult Regular)   Pulse (!) 121   Temp 97.3  F (36.3  C) (Temporal)   Resp 22   Ht 1.651 m (5' 5\")   Wt 50 kg (110 lb 3.2 oz)   SpO2 94%   BMI 18.34 kg/m    General - The patient is cachectic.  Alert and oriented to person and place, interactive.  Head and Face - Normocephalic and atraumatic, with no gross asymmetry noted of the contour of the facial features.  The facial nerve is intact, with strong symmetric movements.  Neck-resolution of bulky lymphadenopathy.  I cannot palpate any concerning lymphadenopathy.    Trach in position with excess secretions  PROCEDURE  Consent was obtained for tracheostomy replacement and risks including loss of airway were discussed.  The tracheostomy is uncuffed was removed and a new 8.5 Shiley deflated cuffed tube was placed.  The obturator was remove the inner cannula was placed.  There is no bleeding.  There is excess granulation tissue at the entire inferior margin of the stoma.  The Velcro trach collar was placed and trach sponge placed.  He tolerated this well.    Eyes - Extraocular movements intact.   Ears- External auditory canals are patent, tympanic membranes are intact without effusion or worrisome retractions   Nose - Nasal mucosa is pink and moist with no abnormal mucus.  NG tube left  Mouth - Examination of the oral cavity shows pink, healthy, moist mucosa.  No lesions or ulceration noted.  Edentulous.  the tongue is mobile and midline.  Throat - The walls of the oropharynx were smooth, pink, moist, symmetric, and had no lesions or ulcerations.  The tonsillar pillars and soft palate were symmetric.  The uvula was midline on elevation.  No oropharyngeal mass    Attempts at mirror laryngoscopy were not possible due to gag reflex.  Therefore I proceeded with a fiberoptic examination after informed consent.  First I applied topical nasal lidocaine and neosynephrine.  I then passed the scope through the right nasal cavity.     The " nasopharynx was mucosally covered and symmetric.  The eustachian tube openings were unobstructed.  Going further down I had a clear view of the base of tongue which had grade 2 lingual tonsillar tissue.  The base of tongue was grossly free of lesions.  The epiglottis was smooth and mucosally covered.  Resolution of the bulky supraglottic mass.  Excess secretions pooling throughout the supraglottis and covering the glottis.  The vocal cords are grossly symmetric without mass.  The false cords and remainder of the supraglottis have moderate edema and are covered with secretions.  The pyriform sinuses are pooling of secretions.  The patient tolerated the procedure well.      Impression/Plan  Gómez Keenan is a 58 year old male    ICD-10-CM    1. Tracheostomy care (H)  Z43.0 lidocaine 2%-oxymetazoline 0.025% nasal solution 2 spray      2. Primary squamous cell carcinoma of supraglottis (H)  C32.1       3. History of radiation to head and neck region  Z92.3       4. 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 Leslie, oncology, as scheduled  Continue [facility], continue NPO  Improve home trach cares    Quit tobacco, still smoking 1/2 ppd     Schedule PEG or G tube [place] next week    Follow-up with General Surgery in [place] for consult for PEG tube or G-tube  Follow-up with RA[facility] Deal in 2-3 months for trach change and possible cap off trial. We will try to downsize at that time. Bring a 6.5mm trach, cuffless with you next time.     Consider trach capping trial after next trach change    Continue [facility]    Total exam time spent on the day of the visit including review of the chart, reviewing pertinent prior imaging and notes, obtaining a detailed history and physical exam, education, discussion with  the patient and documenting in epic was over 60 minutes .  This did not include procedure time.      Niki Mcdonald D.O.  Otolaryngology/Head and Neck Surgery  Allergy